# Patient Record
Sex: FEMALE | Race: OTHER | Employment: PART TIME | URBAN - METROPOLITAN AREA
[De-identification: names, ages, dates, MRNs, and addresses within clinical notes are randomized per-mention and may not be internally consistent; named-entity substitution may affect disease eponyms.]

---

## 2018-05-11 ENCOUNTER — HOSPITAL ENCOUNTER (EMERGENCY)
Age: 23
Discharge: HOME | End: 2018-05-11
Attending: EMERGENCY MEDICINE | Admitting: EMERGENCY MEDICINE
Payer: SELF-PAY

## 2018-05-11 VITALS
HEART RATE: 91 BPM | WEIGHT: 185 LBS | HEIGHT: 63 IN | OXYGEN SATURATION: 98 % | BODY MASS INDEX: 32.78 KG/M2 | SYSTOLIC BLOOD PRESSURE: 143 MMHG | TEMPERATURE: 98.8 F | RESPIRATION RATE: 28 BRPM | DIASTOLIC BLOOD PRESSURE: 81 MMHG

## 2018-05-11 DIAGNOSIS — R06.4 HYPERVENTILATION: Primary | ICD-10-CM

## 2018-05-11 PROCEDURE — 96372 THER/PROPH/DIAG INJ SC/IM: CPT

## 2018-05-11 PROCEDURE — 99283 EMERGENCY DEPT VISIT LOW MDM: CPT

## 2018-05-11 PROCEDURE — 74011250636 HC RX REV CODE- 250/636: Performed by: EMERGENCY MEDICINE

## 2018-05-11 RX ORDER — TRAZODONE HYDROCHLORIDE 100 MG/1
100 TABLET ORAL
COMMUNITY
End: 2018-08-09

## 2018-05-11 RX ORDER — LORAZEPAM 2 MG/ML
1 INJECTION INTRAMUSCULAR ONCE
Status: COMPLETED | OUTPATIENT
Start: 2018-05-11 | End: 2018-05-11

## 2018-05-11 RX ADMIN — LORAZEPAM 1 MG: 2 INJECTION INTRAMUSCULAR; INTRAVENOUS at 05:00

## 2018-05-11 NOTE — DISCHARGE INSTRUCTIONS
Hyperventilation: Care Instructions  Your Care Instructions  Hyperventilation is breathing that is deeper and faster than normal. It causes the amount of carbon dioxide (CO2) in the blood to drop. This may make you feel lightheaded. You may also have a fast heartbeat and feel short of breath. It also can lead to numbness or tingling in the hands or feet, anxiety, fainting, and sore chest muscles. Some causes of sudden hyperventilation include anxiety, asthma, emphysema, a head injury, fever, and some medicines. Hyperventilation also may be caused by a pattern of incorrect breathing. It most often happens when a physical or emotional event makes this breathing pattern worse. Hyperventilation may happen during pregnancy. But it usually goes away on its own after delivery. In many cases, hyperventilation can be controlled by learning proper breathing techniques. Follow-up care is a key part of your treatment and safety. Be sure to make and go to all appointments, and call your doctor if you are having problems. It's also a good idea to know your test results and keep a list of the medicines you take. How can you care for yourself at home? Breathing methods  · Breathe through pursed lips, as if you are whistling. Or pinch one nostril and breathe through your nose. It is harder to hyperventilate through your nose or through pursed lips because you can't move as much air. · Slow your breathing to 1 breath every 5 seconds, or slow enough that symptoms gradually go away. · Try belly-breathing. This fills your lungs fully, slows your breathing rate, and helps you relax. ¨ Place one hand on your belly just below the ribs. Place the other hand on your chest. You can do this while standing, but it may be more comfortable while you lie on the floor with your knees bent. ¨ Take a deep breath through your nose. As you breathe in, let your belly push your hand out. Keep your chest still.   ¨ As you breathe out through pursed lips, feel your hand go down. Use the hand on your belly to help you push all the air out. Take your time breathing out. ¨ Repeat these steps 3 to 10 times. Take your time with each breath. Always try to control your breathing or to belly-breathe first. If these techniques don't work and you don't have other health problems, you might try breathing in and out of a paper bag. Using a paper bag  · Take 6 to 12 easy, natural breaths, with a small paper bag held over your mouth and nose. Then remove the bag from your nose and mouth, and take easy, natural breaths. · Next, try belly-breathing. · Switch between these techniques until your hyperventilation stops. Do not try this method if:  · You have any heart or lung problems. · Rapid breathing happens at a high altitude. Breathing faster than normal is a natural response to high altitude. Follow these safety measures when using this method:  · Do not use a plastic bag. · Do not breathe continuously into a paper bag. Take 6 to 12 natural breaths with a paper bag held over your mouth and nose. Then remove the bag from your nose and mouth. · Do not hold the bag for a person who is hyperventilating. Let the person hold the bag over his or her own mouth and nose. When should you call for help? Call 911 anytime you think you may need emergency care. For example, call if:  ? · You passed out (lost consciousness). ?Call your doctor now or seek immediate medical care if:  ? · You hyperventilate for longer than 30 minutes. ? · You hyperventilate often. ? · Your symptoms do not improve with home treatment. ? · Your symptoms become more severe or more frequent. ? Watch closely for changes in your health, and be sure to contact your doctor if you have any problems. Where can you learn more? Go to http://caitlin-susan.info/. Enter W032 in the search box to learn more about \"Hyperventilation: Care Instructions. \"  Current as of: March 20, 2017  Content Version: 11.4  © 3970-0993 [employer]. Care instructions adapted under license by [employer] (which disclaims liability or warranty for this information). If you have questions about a medical condition or this instruction, always ask your healthcare professional. [address] any warranty or liability for your use of this information.

## 2018-05-11 NOTE — ED NOTES
Pt brought in by EMS. As per EMS, pt found on side of road, crying and hyperventilating after breaking up with boyfriend. Pt received alert, awake, tearful. Pt hyperventilating and unable to answer questions. Difficult to calm. Pt dry heaving. Pt given ativan 1mg IM. Pt instructed to take slow deep breaths to get breathing under control. Continue frequent monitoring.

## 2018-05-11 NOTE — ED PROVIDER NOTES
EMERGENCY DEPARTMENT HISTORY AND PHYSICAL EXAM    Date: 5/11/2018  Patient Name: Hadley Rain    History of Presenting Illness     Chief Complaint   Patient presents with    Anxiety     History Provided By: Patient's Father, Patient's Mother and EMS    Chief Complaint: Panic attack  Duration: PTA   Timing:  Acute  Location: Generalized  Quality: Attack  Severity: N/A  Modifying Factors: No relieving or worsening factors   Associated Symptoms: Hyperventilation, dry heaves, and back pain    Additional History (Context):   4:53 AM  Hadley Rain is a 23 y.o. female who presents via EMS to the emergency department C/O an acute panic attack, onset PTA. Associated sxs include hyperventilation and dry heaving. Per EMS, pt was walking with her boyfriend when he decided to \"break up with her. \" She then began to hyperventilate and dry heave. Per mother and father, the story EMS provided is incorrect. They state that the pt broke up with her boyfriend a few days ago. They state that pt took Trazodone between 11 PM and 12 AM tonight and then went to meet her boyfriend between 15 AM and 1 AM. Per mother and father, the pt began to feel light-headed and dizzy, began hyperventilating, and began having CP and back pain, which led to her \"fainting. \" Per mother, pt has been acting differently after taking her medication. No modifying or aggravating factors were reported. Pt denies any other sxs or complaints. PCP: Marisa Quevedo MD    Current Outpatient Prescriptions   Medication Sig Dispense Refill    traZODone (DESYREL) 100 mg tablet Take 100 mg by mouth nightly. Past History     Past Medical History:  History reviewed. No pertinent past medical history. Past Surgical History:  History reviewed. No pertinent surgical history. Family History:  History reviewed. No pertinent family history.     Social History:  Social History   Substance Use Topics    Smoking status: None    Smokeless tobacco: None    Alcohol use None       Allergies: Allergies   Allergen Reactions    Pcn [Penicillins] Unknown (comments)     Pt cannot communicate at this time. Review of Systems   Review of Systems   Respiratory:        (+) hyperventilating   Gastrointestinal:        (+) dry heaving   Musculoskeletal: Positive for back pain. All other systems reviewed and are negative. Physical Exam     Vitals:    05/11/18 0452 05/11/18 0537   BP: (!) 149/96 143/81   Pulse: (!) 120 91   Resp: (!) 36 28   Temp: 98.8 °F (37.1 °C)    SpO2: 100% 98%   Weight: 83.9 kg (185 lb)    Height: 5' 3\" (1.6 m)      Physical Exam   Constitutional: She is oriented to person, place, and time. She appears well-developed and well-nourished. HENT:   Head: Normocephalic and atraumatic. Eyes: Pupils are equal, round, and reactive to light. Neck: Neck supple. Cardiovascular: Normal rate, regular rhythm, S1 normal, S2 normal and normal heart sounds. Pulmonary/Chest: Breath sounds normal. No respiratory distress. She has no wheezes. She has no rales. She exhibits no tenderness. Hyperventilating upon arrival.    Abdominal: Soft. She exhibits no distension and no mass. There is no tenderness. There is no guarding. Musculoskeletal: Normal range of motion. She exhibits no edema or tenderness. Neurological: She is alert and oriented to person, place, and time. No cranial nerve deficit. Skin: No rash noted. Psychiatric: She has a normal mood and affect. Her behavior is normal. Thought content normal.   Nursing note and vitals reviewed. Diagnostic Study Results     Labs -   No results found for this or any previous visit (from the past 12 hour(s)).     Radiologic Studies -   No orders to display     CT Results  (Last 48 hours)    None        CXR Results  (Last 48 hours)    None          Medications given in the ED-  Medications   LORazepam (ATIVAN) injection 1 mg (1 mg IntraMUSCular Given 5/11/18 0500)       Medical Decision Making I am the first provider for this patient. I reviewed the vital signs, available nursing notes, past medical history, past surgical history, family history and social history. Vital Signs-Reviewed the patient's vital signs. Pulse Oximetry Analysis - 100% on RA     Records Reviewed: Nursing Notes    Provider Notes (Medical Decision Making):   DDX: Hyperventilation. Need to R/O electrolyte abnormalities, medication reaction, UTI, drug abuse, syncope, psych, and seizure. Procedures:  Procedures    ED Course:   4:53 AM Initial assessment performed. The patients presenting problems have been discussed, and they are in agreement with the care plan formulated and outlined with them. I have encouraged them to ask questions as they arise throughout their visit. 5:32 AM Mother at bedside Mother is upset and states that we were not taking the pt seriously. I explained to her what had been done and the history we got from EMS. Pt took Trazodone at 11 PM and since she has been started on it she has not been acting right afterwards. She wants pt DC and wants to go to [facility]. Pt is more calm and able to communicate. She is complaining of pain in her back and she agrees to go with her parents to [facility]. I informed the pt that She needed testing for adequate evaluation for her symptoms, that by refusing workup She is at risk for deterioration. She is awake, alert, She understands her condition and the risks involved in leaving. While the patient has received Ativan, it has been 45 minutes since last receiving this medication when having this conversation and is clinically aware of their surroundings and able to ask appropriate questions, the patients family member mother and father and the nurse present confirms She is not clinically intoxicated and able to make medical decisions. She verbalized knowing the same risks and understood it was recommended that She stay and could also return at any time.   her family member mother and father was present for the discussion and also verbalized that She understood both diagnosis, risks and could return at any time. They were both provided with warnings regarding worsening of her condition and were provided instructions to follow up with Breanna Monique MD tomorrow or return to the Emergency Room as soon as possible. This discussion was witnessed by Wayne Talley RN. Diagnosis and Disposition       DISCHARGE NOTE:  5:37 AM   Annalias Sims's  results have been reviewed with her. She has been counseled regarding her diagnosis, treatment, and plan. She verbally conveys understanding and agreement of the signs, symptoms, diagnosis, treatment and prognosis and additionally agrees to follow up as discussed. She also agrees with the care-plan and conveys that all of her questions have been answered. I have also provided discharge instructions for her that include: educational information regarding their diagnosis and treatment, and list of reasons why they would want to return to the ED prior to their follow-up appointment, should her condition change. She has been provided with education for proper emergency department utilization. CLINICAL IMPRESSION:    1. Hyperventilation        PLAN:  1. D/C Home  2. Current Discharge Medication List        3. Follow-up Information     Follow up With Details Comments [address] [facility]    [facility] EMERGENCY DEPT Go to As needed, if symptoms worsen [address] [id number]  [phone]        _______________________________    Attestations: This note is prepared by Shamika Reynoso. Ruth Sacks, acting as Scribe for [employer], MD.    [employer], MD:  The scribe's documentation has been prepared under my direction and personally reviewed by me in its entirety.   I confirm that the note above accurately reflects all work, treatment, procedures, and medical decision making performed by me.  _______________________________

## 2018-05-11 NOTE — ED NOTES
Parents at bedside and request to speak with MD who is occupied. This RN speaks to mother who states that she thinks pt is having reaction to trazadone. Advised that EMS and pt had conveyed to staff that pt was fine and had no sx prior to boyfriend telling her that he wanted to break up. Mother states that this is not the case. Pt states that she broke up with boyfriend a few days ago and tonight took her trazadone that was rx to her one week ago. States that she took med between 11-12 and went to meet with boyfriend at 12-1. States that while talking she was not upset and was feeling fine. States that she then began to feel dizzy and lightheaded while walking home and had some pain in chest before passing out. States that she then began to hyperventilate. Father request that staff give pt something to get trazadone out of her system. Advised that this wasn't possible but that medication she had been given would help with sx that she was having. Further informed that in light of new complaints and corrected timeline that further medical interventions were needed. Stated need for lab work and EKG and then proceeded to help pt remove shirt. Father stepped out to theodore while this RN and mother helped pt remove top and during this called to mother repeatedly in urgent manner. Mother left to theodore and this RN continued to aid pt. Placed gown on pt and then parents stepped back into room at which point mother stated to this RN \"he wants to take her to [place]\" and pointed to father. This RN advised that MD would be notified and in to speak with patient and family.

## 2018-05-11 NOTE — ED NOTES
Pt's breathing more regular and continued to instruct patient to take slow, deep breaths. Parents requesting to go to [facility] after requesting that her prescribed medication of trazadone be taken out of her system. Parents believe she is having a reaction to her trazadone. They then stated that she took her trazadone around 11pm last night [facility] and when they were walking back home she became dizzy and almost fainted. VS stable at this time. Spoke with parents alongside of Dr. Brigido Moran. Pt to be discharged.

## 2018-05-11 NOTE — ED NOTES
MD into room to speak with mother. Pt agrees to leaving and going to [facility] as parents prefer this. MD out from room and states that parents do not want this RN in room again as mother states that this RN was nonchalant and uncaring. Pt awaiting discharge, this RN not into room to discharge pt per parent request. Panda Aguiar discharge after registration.

## 2018-05-11 NOTE — ED TRIAGE NOTES
Pt was coming home with boyfriend when he decided to break up with her. Pt then proceeded to have a panic attack with dry heaves and hyperventilation. Presents to ER tearful and inconsolable.

## 2018-05-11 NOTE — ED NOTES
Pt stable at this time. Pt being discharged in parents care with patient agreeing to be discharged. I have reviewed discharge instructions with the patient. The patient verbalized understanding.

## 2018-05-11 NOTE — ED NOTES
5:35 AM  Patient was informed by emergency physician that she needed further workupfor adequate evaluation for her chest pain and abdominal pain, and that by refusing the above, she is at risk for further deterioration.  She is awake, alert, and she understands her condition and the risks involved in leaving.   She is clinically aware of her surroundings and able to ask appropriate questions, the patients parents and the nurse present confirm she is able to make medical decisions. She verbalized knowing the risks and understood it was recommended that she stay and could also return at any time.  The patient's parents were present for the discussion and also verbalized that they understood both diagnosis, risks and could return at any time.  The patient was provided with warnings regarding worsening of her condition and were provided instructions to follow up with No primary care provider on file. tomorrow or return to the Emergency Room as soon as possible. This information was discussed by the Emergency Room Physician Venus  and witnessed by Sarha Evans RN.

## 2018-08-09 ENCOUNTER — APPOINTMENT (OUTPATIENT)
Dept: GENERAL RADIOLOGY | Age: 23
End: 2018-08-09
Attending: PHYSICIAN ASSISTANT
Payer: SELF-PAY

## 2018-08-09 ENCOUNTER — HOSPITAL ENCOUNTER (EMERGENCY)
Age: 23
Discharge: HOME | End: 2018-08-09
Attending: EMERGENCY MEDICINE
Payer: SELF-PAY

## 2018-08-09 VITALS
SYSTOLIC BLOOD PRESSURE: 108 MMHG | DIASTOLIC BLOOD PRESSURE: 60 MMHG | OXYGEN SATURATION: 100 % | TEMPERATURE: 98 F | BODY MASS INDEX: 31.76 KG/M2 | HEIGHT: 64 IN | RESPIRATION RATE: 14 BRPM | HEART RATE: 64 BPM | WEIGHT: 186 LBS

## 2018-08-09 DIAGNOSIS — M62.838 MUSCLE SPASM: ICD-10-CM

## 2018-08-09 DIAGNOSIS — M25.552 LEFT HIP PAIN: Primary | ICD-10-CM

## 2018-08-09 LAB
APPEARANCE UR: CLEAR
BACTERIA URNS QL MICRO: ABNORMAL /HPF
BILIRUB UR QL: NEGATIVE
COLOR UR: ABNORMAL
EPITH CASTS URNS QL MICRO: ABNORMAL /LPF (ref 0–5)
GLUCOSE UR STRIP.AUTO-MCNC: NEGATIVE MG/DL
HCG UR QL: NEGATIVE
HGB UR QL STRIP: NEGATIVE
KETONES UR QL STRIP.AUTO: 40 MG/DL
LEUKOCYTE ESTERASE UR QL STRIP.AUTO: ABNORMAL
MUCOUS THREADS URNS QL MICRO: ABNORMAL /LPF
NITRITE UR QL STRIP.AUTO: NEGATIVE
PH UR STRIP: 6 [PH] (ref 5–8)
PROT UR STRIP-MCNC: NEGATIVE MG/DL
RBC #/AREA URNS HPF: ABNORMAL /HPF (ref 0–5)
SP GR UR REFRACTOMETRY: 1.03 (ref 1–1.03)
UROBILINOGEN UR QL STRIP.AUTO: 1 EU/DL (ref 0.2–1)
WBC URNS QL MICRO: ABNORMAL /HPF (ref 0–5)

## 2018-08-09 PROCEDURE — 99284 EMERGENCY DEPT VISIT MOD MDM: CPT

## 2018-08-09 PROCEDURE — 96372 THER/PROPH/DIAG INJ SC/IM: CPT

## 2018-08-09 PROCEDURE — 73502 X-RAY EXAM HIP UNI 2-3 VIEWS: CPT

## 2018-08-09 PROCEDURE — 74011250636 HC RX REV CODE- 250/636: Performed by: PHYSICIAN ASSISTANT

## 2018-08-09 PROCEDURE — 81001 URINALYSIS AUTO W/SCOPE: CPT | Performed by: PHYSICIAN ASSISTANT

## 2018-08-09 PROCEDURE — 81025 URINE PREGNANCY TEST: CPT

## 2018-08-09 RX ORDER — KETOROLAC TROMETHAMINE 30 MG/ML
60 INJECTION, SOLUTION INTRAMUSCULAR; INTRAVENOUS
Status: COMPLETED | OUTPATIENT
Start: 2018-08-09 | End: 2018-08-09

## 2018-08-09 RX ORDER — CYCLOBENZAPRINE HCL 10 MG
10 TABLET ORAL
Qty: 10 TAB | Refills: 0 | Status: SHIPPED | OUTPATIENT
Start: 2018-08-09 | End: 2018-12-07

## 2018-08-09 RX ADMIN — KETOROLAC TROMETHAMINE 60 MG: 30 INJECTION, SOLUTION INTRAMUSCULAR at 17:31

## 2018-08-09 NOTE — DISCHARGE INSTRUCTIONS
Hip Pain: Care Instructions  Your Care Instructions    Hip pain may be caused by many things, including overuse, a fall, or a twisting movement. Another cause of hip pain is arthritis. Your pain may increase when you stand up, walk, or squat. The pain may come and go or may be constant. Home treatment can help relieve hip pain, swelling, and stiffness. If your pain is ongoing, you may need more tests and treatment. Follow-up care is a key part of your treatment and safety. Be sure to make and go to all appointments, and call your doctor if you are having problems. It's also a good idea to know your test results and keep a list of the medicines you take. How can you care for yourself at home? · Take pain medicines exactly as directed. ¨ If the doctor gave you a prescription medicine for pain, take it as prescribed. ¨ If you are not taking a prescription pain medicine, ask your doctor if you can take an over-the-counter medicine. · Rest and protect your hip. Take a break from any activity, including standing or walking, that may cause pain. · Put ice or a cold pack against your hip for 10 to 20 minutes at a time. Try to do this every 1 to 2 hours for the next 3 days (when you are awake) or until the swelling goes down. Put a thin cloth between the ice and your skin. · Sleep on your healthy side with a pillow between your knees, or sleep on your back with pillows under your knees. · If there is no swelling, you can put moist heat, a heating pad, or a warm cloth on your hip. Do gentle stretching exercises to help keep your hip flexible. · Learn how to prevent falls. Have your vision and hearing checked regularly. Wear slippers or shoes with a nonskid sole. · Stay at a healthy weight. · Wear comfortable shoes. When should you call for help? Call 911 anytime you think you may need emergency care.  For example, call if:    · You have sudden chest pain and shortness of breath, or you cough up blood.     · You are not able to stand or walk or bear weight.     · Your buttocks, legs, or feet feel numb or tingly.     · Your leg or foot is cool or pale or changes color.     · You have severe pain.    Call your doctor now or seek immediate medical care if:    · You have signs of infection, such as:  ¨ Increased pain, swelling, warmth, or redness in the hip area. ¨ Red streaks leading from the hip area. ¨ Pus draining from the hip area. ¨ A fever.     · You have signs of a blood clot, such as:  ¨ Pain in your calf, back of the knee, thigh, or groin. ¨ Redness and swelling in your leg or groin.     · You are not able to bend, straighten, or move your leg normally.     · You have trouble urinating or having bowel movements.    Watch closely for changes in your health, and be sure to contact your doctor if:    · You do not get better as expected. Where can you learn more? Go to http://caitlin-susan.info/. Enter V738 in the search box to learn more about \"Hip Pain: Care Instructions. \"  Current as of: November 20, 2017  Content Version: 11.7  © 8596-1133 [employer]. Care instructions adapted under license by [employer] (which disclaims liability or warranty for this information). If you have questions about a medical condition or this instruction, always ask your healthcare professional. [place] [address] any warranty or liability for your use of this information. Muscle Cramps: Care Instructions  Your Care Instructions    A muscle cramp occurs when a muscle tightens up suddenly. A cramp often happens in the legs. A muscle cramp is also called a muscle spasm or a charley horse. Muscle cramps usually last less than a minute. However, the pain may last for several minutes. Leg cramps that occur at night may wake you up. Heavy exercise, dehydration, and being overweight can increase your risk of getting cramps.  An imbalance of certain chemicals in your blood, called electrolytes, can also lead to muscle cramps. Pregnant women sometimes get muscle cramps during sleep. Muscle cramps can be treated by stretching and massaging the muscle. If cramps keep coming back, your doctor may prescribe medicine that relaxes your muscles. Follow-up care is a key part of your treatment and safety. Be sure to make and go to all appointments, and call your doctor if you are having problems. It's also a good idea to know your test results and keep a list of the medicines you take. How can you care for yourself at home? · Drink plenty of fluids to prevent dehydration. Choose water and other caffeine-free clear liquids until you feel better. If you have kidney, heart, or liver disease and have to limit fluids, talk with your doctor before you increase the amount of fluids you drink. · Stretch your muscles every day, especially before and after exercise and at bedtime. Regular stretching can relax your muscles and may prevent cramps. · Do not suddenly increase the amount of exercise you get. Increase your exercise a little each week. · When you get a cramp, stretch and massage the muscle. You can also take a warm shower or bath to relax the muscle. A heating pad placed on the muscle can also help. · Take a daily multivitamin supplement. · Ask your doctor if you can take an over-the-counter pain medicine, such as acetaminophen (Tylenol), ibuprofen (Advil, Motrin), or naproxen (Aleve). Be safe with medicines. Read and follow all instructions on the label. When should you call for help? Watch closely for changes in your health, and be sure to contact your doctor if:    · You get muscle cramps often that do not go away after home treatment.     · Your muscle cramps often wake you up at night.     · You do not get better as expected. Where can you learn more? Go to http://caitlin-susan.info/.   Enter K934 in the search box to learn more about \"Muscle Cramps: Care Instructions. \"  Current as of: November 29, 2017  Content Version: 11.7  © 8147-0687 [employer]. Care instructions adapted under license by [employer] (which disclaims liability or warranty for this information). If you have questions about a medical condition or this instruction, always ask your healthcare professional. [place] [address] any warranty or liability for your use of this information.
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
## 2018-08-09 NOTE — ED TRIAGE NOTES
Patient actively hyperventilating in triage. Patient was jumping on a trampoline 2 days ago and since then has been having stabbing left hip pain.

## 2018-12-07 ENCOUNTER — APPOINTMENT (OUTPATIENT)
Dept: ULTRASOUND IMAGING | Age: 23
DRG: 195 | End: 2018-12-07
Attending: PHYSICIAN ASSISTANT
Payer: COMMERCIAL

## 2018-12-07 ENCOUNTER — APPOINTMENT (OUTPATIENT)
Dept: GENERAL RADIOLOGY | Age: 23
DRG: 195 | End: 2018-12-07
Attending: PHYSICIAN ASSISTANT
Payer: COMMERCIAL

## 2018-12-07 ENCOUNTER — HOSPITAL ENCOUNTER (INPATIENT)
Age: 23
LOS: 1 days | Discharge: HOME | DRG: 195 | End: 2018-12-09
Attending: EMERGENCY MEDICINE | Admitting: INTERNAL MEDICINE
Payer: COMMERCIAL

## 2018-12-07 DIAGNOSIS — J18.9 PNEUMONIA OF RIGHT MIDDLE LOBE DUE TO INFECTIOUS ORGANISM: Primary | ICD-10-CM

## 2018-12-07 DIAGNOSIS — R10.9 RIGHT FLANK PAIN: ICD-10-CM

## 2018-12-07 LAB
ALBUMIN SERPL-MCNC: 4 G/DL (ref 3.4–5)
ALBUMIN/GLOB SERPL: 1.2 {RATIO} (ref 0.8–1.7)
ALP SERPL-CCNC: 74 U/L (ref 45–117)
ALT SERPL-CCNC: 17 U/L (ref 13–56)
ANION GAP SERPL CALC-SCNC: 10 MMOL/L (ref 3–18)
APPEARANCE UR: CLEAR
APTT PPP: 32.2 SEC (ref 23–36.4)
AST SERPL-CCNC: 18 U/L (ref 15–37)
BACTERIA URNS QL MICRO: ABNORMAL /HPF
BASOPHILS # BLD: 0.1 K/UL (ref 0–0.1)
BASOPHILS NFR BLD: 1 % (ref 0–2)
BILIRUB SERPL-MCNC: 0.4 MG/DL (ref 0.2–1)
BILIRUB UR QL: NEGATIVE
BUN SERPL-MCNC: 10 MG/DL (ref 7–18)
BUN/CREAT SERPL: 13
CALCIUM SERPL-MCNC: 9.4 MG/DL (ref 8.5–10.1)
CHLORIDE SERPL-SCNC: 109 MMOL/L (ref 100–108)
CK MB CFR SERPL CALC: ABNORMAL % (ref 0–4)
CK MB SERPL-MCNC: <1 NG/ML (ref 5–25)
CK SERPL-CCNC: 212 U/L (ref 26–192)
CO2 SERPL-SCNC: 20 MMOL/L (ref 21–32)
COLOR UR: YELLOW
CREAT SERPL-MCNC: 0.77 MG/DL (ref 0.6–1.3)
DIFFERENTIAL METHOD BLD: ABNORMAL
EOSINOPHIL # BLD: 0.2 K/UL (ref 0–0.4)
EOSINOPHIL NFR BLD: 2 % (ref 0–5)
EPITH CASTS URNS QL MICRO: ABNORMAL /LPF (ref 0–5)
ERYTHROCYTE [DISTWIDTH] IN BLOOD BY AUTOMATED COUNT: 12.5 % (ref 11.6–14.5)
GLOBULIN SER CALC-MCNC: 3.3 G/DL (ref 2–4)
GLUCOSE SERPL-MCNC: 85 MG/DL (ref 74–99)
GLUCOSE UR STRIP.AUTO-MCNC: NEGATIVE MG/DL
HCG UR QL: NEGATIVE
HCT VFR BLD AUTO: 37.2 % (ref 35–45)
HGB BLD-MCNC: 12.8 G/DL (ref 12–16)
HGB UR QL STRIP: NEGATIVE
INR PPP: 1 (ref 0.8–1.2)
KETONES UR QL STRIP.AUTO: ABNORMAL MG/DL
LEUKOCYTE ESTERASE UR QL STRIP.AUTO: ABNORMAL
LIPASE SERPL-CCNC: 121 U/L (ref 73–393)
LYMPHOCYTES # BLD: 2 K/UL (ref 0.9–3.6)
LYMPHOCYTES NFR BLD: 19 % (ref 21–52)
MCH RBC QN AUTO: 28.6 PG (ref 24–34)
MCHC RBC AUTO-ENTMCNC: 34.4 G/DL (ref 31–37)
MCV RBC AUTO: 83 FL (ref 74–97)
MONOCYTES # BLD: 1 K/UL (ref 0.05–1.2)
MONOCYTES NFR BLD: 9 % (ref 3–10)
MUCOUS THREADS URNS QL MICRO: ABNORMAL /LPF
NEUTS SEG # BLD: 7.2 K/UL (ref 1.8–8)
NEUTS SEG NFR BLD: 69 % (ref 40–73)
NITRITE UR QL STRIP.AUTO: NEGATIVE
PH UR STRIP: 5.5 [PH] (ref 5–8)
PLATELET # BLD AUTO: 362 K/UL (ref 135–420)
PMV BLD AUTO: 9.1 FL (ref 9.2–11.8)
POTASSIUM SERPL-SCNC: 3.8 MMOL/L (ref 3.5–5.5)
PROT SERPL-MCNC: 7.3 G/DL (ref 6.4–8.2)
PROT UR STRIP-MCNC: NEGATIVE MG/DL
PROTHROMBIN TIME: 12.9 SEC (ref 11.5–15.2)
RBC # BLD AUTO: 4.48 M/UL (ref 4.2–5.3)
RBC #/AREA URNS HPF: NEGATIVE /HPF (ref 0–5)
SERVICE CMNT-IMP: NORMAL
SODIUM SERPL-SCNC: 139 MMOL/L (ref 136–145)
SP GR UR REFRACTOMETRY: 1.02 (ref 1–1.03)
TROPONIN I SERPL-MCNC: <0.02 NG/ML (ref 0–0.04)
UROBILINOGEN UR QL STRIP.AUTO: 0.2 EU/DL (ref 0.2–1)
WBC # BLD AUTO: 10.4 K/UL (ref 4.6–13.2)
WBC URNS QL MICRO: ABNORMAL /HPF (ref 0–5)
WET PREP GENITAL: NORMAL
WET PREP GENITAL: NORMAL

## 2018-12-07 PROCEDURE — 96374 THER/PROPH/DIAG INJ IV PUSH: CPT

## 2018-12-07 PROCEDURE — 93005 ELECTROCARDIOGRAM TRACING: CPT

## 2018-12-07 PROCEDURE — 96376 TX/PRO/DX INJ SAME DRUG ADON: CPT

## 2018-12-07 PROCEDURE — 74022 RADEX COMPL AQT ABD SERIES: CPT

## 2018-12-07 PROCEDURE — 96372 THER/PROPH/DIAG INJ SC/IM: CPT

## 2018-12-07 PROCEDURE — 80053 COMPREHEN METABOLIC PANEL: CPT

## 2018-12-07 PROCEDURE — 85025 COMPLETE CBC W/AUTO DIFF WBC: CPT

## 2018-12-07 PROCEDURE — 87210 SMEAR WET MOUNT SALINE/INK: CPT

## 2018-12-07 PROCEDURE — 82553 CREATINE MB FRACTION: CPT

## 2018-12-07 PROCEDURE — 87591 N.GONORRHOEAE DNA AMP PROB: CPT

## 2018-12-07 PROCEDURE — 85730 THROMBOPLASTIN TIME PARTIAL: CPT

## 2018-12-07 PROCEDURE — 81025 URINE PREGNANCY TEST: CPT

## 2018-12-07 PROCEDURE — 99285 EMERGENCY DEPT VISIT HI MDM: CPT

## 2018-12-07 PROCEDURE — 81001 URINALYSIS AUTO W/SCOPE: CPT

## 2018-12-07 PROCEDURE — 76830 TRANSVAGINAL US NON-OB: CPT

## 2018-12-07 PROCEDURE — 74011250636 HC RX REV CODE- 250/636: Performed by: PHYSICIAN ASSISTANT

## 2018-12-07 PROCEDURE — 96375 TX/PRO/DX INJ NEW DRUG ADDON: CPT

## 2018-12-07 PROCEDURE — 85610 PROTHROMBIN TIME: CPT

## 2018-12-07 PROCEDURE — 74011250636 HC RX REV CODE- 250/636: Performed by: EMERGENCY MEDICINE

## 2018-12-07 PROCEDURE — 96361 HYDRATE IV INFUSION ADD-ON: CPT

## 2018-12-07 PROCEDURE — 83690 ASSAY OF LIPASE: CPT

## 2018-12-07 RX ORDER — FENTANYL CITRATE 50 UG/ML
100 INJECTION, SOLUTION INTRAMUSCULAR; INTRAVENOUS ONCE
Status: COMPLETED | OUTPATIENT
Start: 2018-12-07 | End: 2018-12-08

## 2018-12-07 RX ORDER — ONDANSETRON 2 MG/ML
4 INJECTION INTRAMUSCULAR; INTRAVENOUS
Status: COMPLETED | OUTPATIENT
Start: 2018-12-07 | End: 2018-12-07

## 2018-12-07 RX ORDER — MORPHINE SULFATE 4 MG/ML
4 INJECTION INTRAVENOUS
Status: COMPLETED | OUTPATIENT
Start: 2018-12-07 | End: 2018-12-07

## 2018-12-07 RX ORDER — FAMOTIDINE 10 MG/ML
20 INJECTION INTRAVENOUS
Status: COMPLETED | OUTPATIENT
Start: 2018-12-07 | End: 2018-12-07

## 2018-12-07 RX ORDER — ONDANSETRON 2 MG/ML
4 INJECTION INTRAMUSCULAR; INTRAVENOUS
Status: COMPLETED | OUTPATIENT
Start: 2018-12-07 | End: 2018-12-08

## 2018-12-07 RX ORDER — DICYCLOMINE HYDROCHLORIDE 10 MG/ML
20 INJECTION INTRAMUSCULAR ONCE
Status: COMPLETED | OUTPATIENT
Start: 2018-12-07 | End: 2018-12-07

## 2018-12-07 RX ORDER — FENTANYL CITRATE 50 UG/ML
50 INJECTION, SOLUTION INTRAMUSCULAR; INTRAVENOUS
Status: COMPLETED | OUTPATIENT
Start: 2018-12-07 | End: 2018-12-07

## 2018-12-07 RX ORDER — HYDROMORPHONE HYDROCHLORIDE 1 MG/ML
1 INJECTION, SOLUTION INTRAMUSCULAR; INTRAVENOUS; SUBCUTANEOUS ONCE
Status: COMPLETED | OUTPATIENT
Start: 2018-12-07 | End: 2018-12-08

## 2018-12-07 RX ADMIN — ONDANSETRON 4 MG: 2 INJECTION INTRAMUSCULAR; INTRAVENOUS at 19:38

## 2018-12-07 RX ADMIN — SODIUM CHLORIDE 1000 ML: 900 INJECTION, SOLUTION INTRAVENOUS at 21:49

## 2018-12-07 RX ADMIN — DICYCLOMINE HYDROCHLORIDE 20 MG: 20 INJECTION, SOLUTION INTRAMUSCULAR at 18:04

## 2018-12-07 RX ADMIN — MORPHINE SULFATE 4 MG: 4 INJECTION INTRAVENOUS at 22:01

## 2018-12-07 RX ADMIN — MORPHINE SULFATE 4 MG: 4 INJECTION INTRAVENOUS at 19:38

## 2018-12-07 RX ADMIN — FAMOTIDINE 20 MG: 10 INJECTION, SOLUTION INTRAVENOUS at 18:01

## 2018-12-07 RX ADMIN — FENTANYL CITRATE 50 MCG: 50 INJECTION, SOLUTION INTRAMUSCULAR; INTRAVENOUS at 18:02

## 2018-12-07 RX ADMIN — SODIUM CHLORIDE 1000 ML: 900 INJECTION, SOLUTION INTRAVENOUS at 19:30

## 2018-12-07 RX ADMIN — SODIUM CHLORIDE 1000 ML: 900 INJECTION, SOLUTION INTRAVENOUS at 18:02

## 2018-12-07 RX ADMIN — ONDANSETRON 4 MG: 2 INJECTION INTRAMUSCULAR; INTRAVENOUS at 22:01

## 2018-12-07 RX ADMIN — ONDANSETRON 4 MG: 2 INJECTION INTRAMUSCULAR; INTRAVENOUS at 18:01
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
## 2018-12-07 NOTE — LETTER
[facility] [place] [id number] 
[phone] Date: 12/12/2018 Iona Mcdermott Please call [facility] at [phone] to discuss your results as soon as possible Sincerely, Js Jones PA-C
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
## 2018-12-08 ENCOUNTER — APPOINTMENT (OUTPATIENT)
Dept: CT IMAGING | Age: 23
DRG: 195 | End: 2018-12-08
Attending: PHYSICIAN ASSISTANT
Payer: COMMERCIAL

## 2018-12-08 PROBLEM — R10.9 FLANK PAIN: Status: ACTIVE | Noted: 2018-12-08

## 2018-12-08 PROBLEM — R11.2 INTRACTABLE VOMITING WITH NAUSEA: Status: ACTIVE | Noted: 2018-12-08

## 2018-12-08 PROBLEM — J18.9 RIGHT MIDDLE LOBE PNEUMONIA: Status: ACTIVE | Noted: 2018-12-08

## 2018-12-08 PROBLEM — J18.9 PNEUMONIA: Status: ACTIVE | Noted: 2018-12-08

## 2018-12-08 LAB
ANION GAP SERPL CALC-SCNC: 11 MMOL/L (ref 3–18)
BUN SERPL-MCNC: 6 MG/DL (ref 7–18)
BUN/CREAT SERPL: 10
CALCIUM SERPL-MCNC: 7.9 MG/DL (ref 8.5–10.1)
CHLORIDE SERPL-SCNC: 109 MMOL/L (ref 100–108)
CO2 SERPL-SCNC: 17 MMOL/L (ref 21–32)
CREAT SERPL-MCNC: 0.63 MG/DL (ref 0.6–1.3)
FLUAV AG NPH QL IA: NEGATIVE
FLUBV AG NOSE QL IA: NEGATIVE
GLUCOSE SERPL-MCNC: 79 MG/DL (ref 74–99)
POTASSIUM SERPL-SCNC: 3.4 MMOL/L (ref 3.5–5.5)
SODIUM SERPL-SCNC: 137 MMOL/L (ref 136–145)

## 2018-12-08 PROCEDURE — 74011000250 HC RX REV CODE- 250: Performed by: EMERGENCY MEDICINE

## 2018-12-08 PROCEDURE — 74011250636 HC RX REV CODE- 250/636: Performed by: INTERNAL MEDICINE

## 2018-12-08 PROCEDURE — 96376 TX/PRO/DX INJ SAME DRUG ADON: CPT

## 2018-12-08 PROCEDURE — 87804 INFLUENZA ASSAY W/OPTIC: CPT

## 2018-12-08 PROCEDURE — 74011250636 HC RX REV CODE- 250/636: Performed by: EMERGENCY MEDICINE

## 2018-12-08 PROCEDURE — 74177 CT ABD & PELVIS W/CONTRAST: CPT

## 2018-12-08 PROCEDURE — 65270000029 HC RM PRIVATE

## 2018-12-08 PROCEDURE — 80048 BASIC METABOLIC PNL TOTAL CA: CPT

## 2018-12-08 PROCEDURE — 77010033678 HC OXYGEN DAILY

## 2018-12-08 PROCEDURE — 74011250637 HC RX REV CODE- 250/637: Performed by: EMERGENCY MEDICINE

## 2018-12-08 PROCEDURE — 87040 BLOOD CULTURE FOR BACTERIA: CPT

## 2018-12-08 PROCEDURE — 74011000258 HC RX REV CODE- 258: Performed by: INTERNAL MEDICINE

## 2018-12-08 PROCEDURE — 74011636320 HC RX REV CODE- 636/320: Performed by: EMERGENCY MEDICINE

## 2018-12-08 RX ORDER — HYDROMORPHONE HYDROCHLORIDE 1 MG/ML
1 INJECTION, SOLUTION INTRAMUSCULAR; INTRAVENOUS; SUBCUTANEOUS
Status: DISCONTINUED | OUTPATIENT
Start: 2018-12-08 | End: 2018-12-09 | Stop reason: HOSPADM

## 2018-12-08 RX ORDER — CEFTRIAXONE 1 G/1
1 INJECTION, POWDER, FOR SOLUTION INTRAMUSCULAR; INTRAVENOUS
Status: DISCONTINUED | OUTPATIENT
Start: 2018-12-08 | End: 2018-12-08

## 2018-12-08 RX ORDER — ONDANSETRON 2 MG/ML
4 INJECTION INTRAMUSCULAR; INTRAVENOUS
Status: DISCONTINUED | OUTPATIENT
Start: 2018-12-08 | End: 2018-12-09 | Stop reason: HOSPADM

## 2018-12-08 RX ORDER — SODIUM CHLORIDE 0.9 % (FLUSH) 0.9 %
5-10 SYRINGE (ML) INJECTION EVERY 8 HOURS
Status: DISCONTINUED | OUTPATIENT
Start: 2018-12-08 | End: 2018-12-09 | Stop reason: HOSPADM

## 2018-12-08 RX ORDER — DEXTROSE MONOHYDRATE AND SODIUM CHLORIDE 5; .9 G/100ML; G/100ML
100 INJECTION, SOLUTION INTRAVENOUS CONTINUOUS
Status: DISCONTINUED | OUTPATIENT
Start: 2018-12-08 | End: 2018-12-09 | Stop reason: HOSPADM

## 2018-12-08 RX ORDER — ENOXAPARIN SODIUM 100 MG/ML
40 INJECTION SUBCUTANEOUS EVERY 24 HOURS
Status: DISCONTINUED | OUTPATIENT
Start: 2018-12-08 | End: 2018-12-09 | Stop reason: HOSPADM

## 2018-12-08 RX ORDER — POTASSIUM CHLORIDE 7.45 MG/ML
10 INJECTION INTRAVENOUS ONCE
Status: COMPLETED | OUTPATIENT
Start: 2018-12-08 | End: 2018-12-09

## 2018-12-08 RX ORDER — HYDROMORPHONE HYDROCHLORIDE 1 MG/ML
1 INJECTION, SOLUTION INTRAMUSCULAR; INTRAVENOUS; SUBCUTANEOUS
Status: DISCONTINUED | OUTPATIENT
Start: 2018-12-08 | End: 2018-12-08

## 2018-12-08 RX ORDER — DIPHENHYDRAMINE HCL 12.5MG/5ML
50 ELIXIR ORAL
Status: COMPLETED | OUTPATIENT
Start: 2018-12-08 | End: 2018-12-08

## 2018-12-08 RX ORDER — SODIUM CHLORIDE 0.9 % (FLUSH) 0.9 %
5-10 SYRINGE (ML) INJECTION AS NEEDED
Status: DISCONTINUED | OUTPATIENT
Start: 2018-12-08 | End: 2018-12-09 | Stop reason: HOSPADM

## 2018-12-08 RX ORDER — LORAZEPAM 2 MG/ML
1 INJECTION INTRAMUSCULAR
Status: COMPLETED | OUTPATIENT
Start: 2018-12-08 | End: 2018-12-08

## 2018-12-08 RX ADMIN — LORAZEPAM 1 MG: 2 INJECTION INTRAMUSCULAR; INTRAVENOUS at 04:00

## 2018-12-08 RX ADMIN — HYDROMORPHONE HYDROCHLORIDE 1 MG: 1 INJECTION, SOLUTION INTRAMUSCULAR; INTRAVENOUS; SUBCUTANEOUS at 02:09

## 2018-12-08 RX ADMIN — SODIUM CHLORIDE 500 MG: 900 INJECTION, SOLUTION INTRAVENOUS at 04:08

## 2018-12-08 RX ADMIN — HYDROMORPHONE HYDROCHLORIDE 1 MG: 1 INJECTION, SOLUTION INTRAMUSCULAR; INTRAVENOUS; SUBCUTANEOUS at 18:14

## 2018-12-08 RX ADMIN — FENTANYL CITRATE 100 MCG: 50 INJECTION, SOLUTION INTRAMUSCULAR; INTRAVENOUS at 00:46

## 2018-12-08 RX ADMIN — ONDANSETRON 4 MG: 2 INJECTION INTRAMUSCULAR; INTRAVENOUS at 22:30

## 2018-12-08 RX ADMIN — IOPAMIDOL 100 ML: 612 INJECTION, SOLUTION INTRAVENOUS at 02:16

## 2018-12-08 RX ADMIN — DEXTROSE MONOHYDRATE AND SODIUM CHLORIDE 100 ML/HR: 5; .9 INJECTION, SOLUTION INTRAVENOUS at 08:00

## 2018-12-08 RX ADMIN — ONDANSETRON 4 MG: 2 INJECTION INTRAMUSCULAR; INTRAVENOUS at 18:14

## 2018-12-08 RX ADMIN — POTASSIUM CHLORIDE 10 MEQ: 7.46 INJECTION, SOLUTION INTRAVENOUS at 08:22

## 2018-12-08 RX ADMIN — ONDANSETRON 4 MG: 2 INJECTION INTRAMUSCULAR; INTRAVENOUS at 00:47

## 2018-12-08 RX ADMIN — DIPHENHYDRAMINE HYDROCHLORIDE 50 MG: 25 SOLUTION ORAL at 02:07

## 2018-12-08 RX ADMIN — HYDROMORPHONE HYDROCHLORIDE 1 MG: 1 INJECTION, SOLUTION INTRAMUSCULAR; INTRAVENOUS; SUBCUTANEOUS at 21:35

## 2018-12-08 RX ADMIN — ENOXAPARIN SODIUM 40 MG: 40 INJECTION SUBCUTANEOUS at 08:00

## 2018-12-08 RX ADMIN — CEFTRIAXONE 1 G: 1 INJECTION, POWDER, FOR SOLUTION INTRAMUSCULAR; INTRAVENOUS at 03:58

## 2018-12-08 NOTE — PROGRESS NOTES
Bedside shift change report given to VIVIANA Morales (oncoming nurse) by ANITHA Bower (offgoing nurse). Report included the following information SBAR, Kardex, Intake/Output and MAR.

## 2018-12-08 NOTE — PROGRESS NOTES
Problem: Falls - Risk of  Goal: *Absence of Falls  Document Heriberto Fall Risk and appropriate interventions in the flowsheet.   Outcome: Progressing Towards Goal  Fall Risk Interventions:            Medication Interventions: Evaluate medications/consider consulting pharmacy, Patient to call before getting OOB, Teach patient to arise slowly
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
## 2018-12-08 NOTE — ROUTINE PROCESS
TRANSFER - OUT REPORT:    Verbal report given to Kervin Weinstein RN(name) on Shlomo Hem  being transferred to [facility] (unit) for routine progression of care       Report consisted of patients Situation, Background, Assessment and   Recommendations(SBAR). Information from the following report(s) SBAR, ED Summary, Procedure Summary, Intake/Output, MAR, Recent Results, Med Rec Status and Cardiac Rhythm normal sinus  was reviewed with the receiving nurse. Lines:   Peripheral IV 12/07/18 Right Antecubital (Active)   Site Assessment Clean, dry, & intact 12/7/2018  5:42 PM   Phlebitis Assessment 0 12/7/2018  5:42 PM   Infiltration Assessment 0 12/7/2018  5:42 PM   Dressing Status Clean, dry, & intact 12/7/2018  5:42 PM   Dressing Type Tape;Transparent 12/7/2018  5:42 PM   Hub Color/Line Status Pink 12/7/2018  5:42 PM   Action Taken Blood drawn 12/7/2018  5:42 PM   Alcohol Cap Used Yes 12/7/2018  5:42 PM        Opportunity for questions and clarification was provided.       Patient transported with:   Monitor  O2 @ 2 liters  Tech

## 2018-12-08 NOTE — PROGRESS NOTES
Shift Summary: Pt A&Ox 4, friend at bedside, Pt in NAD, IV infusing. PRN pain and nausea meds required x1 this shift. Will continue to monitor.

## 2018-12-08 NOTE — PROGRESS NOTES
Bedside shift change report given to ANITHA Lyons (oncoming nurse) by Romayne Pitter (offgoing nurse). Report included the following information SBAR, Kardex, Intake/Output, MAR and Recent Results.

## 2018-12-08 NOTE — ROUTINE PROCESS
Bedside and Verbal shift change report given to Jimmie Fletcher RN (oncoming nurse) by Светлана Garay RN (offgoing nurse). Report included the following information SBAR, Kardex, ED Summary, Procedure Summary, Intake/Output, MAR, Recent Results and Med Rec Status.

## 2018-12-08 NOTE — ROUTINE PROCESS
TRANSFER - IN REPORT:    Verbal report received from Shashi Cole, RN (name) on [facility]  being received from ED (unit) for routine progression of care      Report consisted of patients Situation, Background, Assessment and   Recommendations(SBAR). Information from the following report(s) SBAR, Kardex, ED Summary, Procedure Summary, Intake/Output, MAR, Recent Results and Med Rec Status was reviewed with the receiving nurse. Opportunity for questions and clarification was provided. Assessment completed upon patients arrival to unit and care assumed.

## 2018-12-08 NOTE — ED NOTES
Assumed care and received report of patient from Yamila Charles RN, pt on stretcher recently back from Ultrasound pt reports increase in pain, pt tearful boyfriend bedside.

## 2018-12-08 NOTE — PROGRESS NOTES
DC Plan:  Discharge home with MD follow up, family assistance once medically stable    Chart reviewed. Pt admitted for pneumonia. Met with pt and pts friend at bedside. Pt independent. Pt lives in [place] with her brother. Pts friend/family will drive her home at discharge. Pt noted be wearing oxygen on assessment, but denies having home oxygen. Nursing, please wean pt off oxygen as medically appropriate. Pts PCP is MD Candi Felty. Pt noted to be self pay, Med Assist notified. Pt states she thinks she has insurance and will try to locate card. No immediate dc concerns identified. CM will cont to follow, as needed. Reason for Admission:   pneumonia                   RRAT Score:     6 low                Plan for utilizing home health:    Pt independent, none                      Likelihood of Readmission:  low                         Transition of Care Plan:      Discharge home with MD follow up                Care Management Interventions  PCP Verified by CM: Zaida Escalante)  Mode of Transport at Discharge:  Other (see comment)(friend)  Transition of Care Consult (CM Consult): Discharge Planning  Current Support Network: Relative's Home(lives w brother)  Plan discussed with Pt/Family/Caregiver: Yes  Discharge Location  Discharge Placement: Home with family assistance

## 2018-12-09 VITALS
HEART RATE: 78 BPM | SYSTOLIC BLOOD PRESSURE: 132 MMHG | RESPIRATION RATE: 18 BRPM | TEMPERATURE: 98.3 F | WEIGHT: 172.18 LBS | DIASTOLIC BLOOD PRESSURE: 104 MMHG | BODY MASS INDEX: 30.51 KG/M2 | OXYGEN SATURATION: 100 % | HEIGHT: 63 IN

## 2018-12-09 LAB
ANION GAP SERPL CALC-SCNC: 7 MMOL/L (ref 3–18)
BASOPHILS # BLD: 0 K/UL (ref 0–0.1)
BASOPHILS NFR BLD: 1 % (ref 0–2)
BUN SERPL-MCNC: 5 MG/DL (ref 7–18)
BUN/CREAT SERPL: 8
CALCIUM SERPL-MCNC: 8.5 MG/DL (ref 8.5–10.1)
CHLORIDE SERPL-SCNC: 108 MMOL/L (ref 100–108)
CO2 SERPL-SCNC: 24 MMOL/L (ref 21–32)
CREAT SERPL-MCNC: 0.66 MG/DL (ref 0.6–1.3)
DIFFERENTIAL METHOD BLD: ABNORMAL
EOSINOPHIL # BLD: 0.2 K/UL (ref 0–0.4)
EOSINOPHIL NFR BLD: 3 % (ref 0–5)
ERYTHROCYTE [DISTWIDTH] IN BLOOD BY AUTOMATED COUNT: 12.7 % (ref 11.6–14.5)
GLUCOSE SERPL-MCNC: 86 MG/DL (ref 74–99)
HCT VFR BLD AUTO: 34.8 % (ref 35–45)
HGB BLD-MCNC: 11.7 G/DL (ref 12–16)
LYMPHOCYTES # BLD: 1.9 K/UL (ref 0.9–3.6)
LYMPHOCYTES NFR BLD: 32 % (ref 21–52)
MCH RBC QN AUTO: 28.2 PG (ref 24–34)
MCHC RBC AUTO-ENTMCNC: 33.6 G/DL (ref 31–37)
MCV RBC AUTO: 83.9 FL (ref 74–97)
MONOCYTES # BLD: 0.6 K/UL (ref 0.05–1.2)
MONOCYTES NFR BLD: 10 % (ref 3–10)
NEUTS SEG # BLD: 3.1 K/UL (ref 1.8–8)
NEUTS SEG NFR BLD: 54 % (ref 40–73)
PLATELET # BLD AUTO: 336 K/UL (ref 135–420)
PMV BLD AUTO: 9.1 FL (ref 9.2–11.8)
POTASSIUM SERPL-SCNC: 3.8 MMOL/L (ref 3.5–5.5)
RBC # BLD AUTO: 4.15 M/UL (ref 4.2–5.3)
SODIUM SERPL-SCNC: 139 MMOL/L (ref 136–145)
WBC # BLD AUTO: 5.7 K/UL (ref 4.6–13.2)

## 2018-12-09 PROCEDURE — 80048 BASIC METABOLIC PNL TOTAL CA: CPT

## 2018-12-09 PROCEDURE — 36415 COLL VENOUS BLD VENIPUNCTURE: CPT

## 2018-12-09 PROCEDURE — 74011250636 HC RX REV CODE- 250/636: Performed by: INTERNAL MEDICINE

## 2018-12-09 PROCEDURE — 85025 COMPLETE CBC W/AUTO DIFF WBC: CPT

## 2018-12-09 PROCEDURE — 74011000250 HC RX REV CODE- 250: Performed by: INTERNAL MEDICINE

## 2018-12-09 PROCEDURE — 77010033678 HC OXYGEN DAILY

## 2018-12-09 RX ORDER — LEVOFLOXACIN 750 MG/1
750 TABLET ORAL DAILY
Qty: 7 TAB | Refills: 0 | Status: SHIPPED | OUTPATIENT
Start: 2018-12-09 | End: 2018-12-16

## 2018-12-09 RX ORDER — ACETAMINOPHEN 500 MG
1000 TABLET ORAL
Qty: 60 TAB | Refills: 0 | Status: SHIPPED | OUTPATIENT
Start: 2018-12-09 | End: 2022-07-31

## 2018-12-09 RX ORDER — ACETAMINOPHEN 500 MG
1000 TABLET ORAL
Status: DISCONTINUED | OUTPATIENT
Start: 2018-12-09 | End: 2018-12-09 | Stop reason: HOSPADM

## 2018-12-09 RX ORDER — GUAIFENESIN 600 MG/1
600 TABLET, EXTENDED RELEASE ORAL 2 TIMES DAILY
Qty: 30 TAB | Refills: 0 | Status: SHIPPED | OUTPATIENT
Start: 2018-12-09 | End: 2022-07-31

## 2018-12-09 RX ADMIN — CEFTRIAXONE 1 G: 1 INJECTION, POWDER, FOR SOLUTION INTRAMUSCULAR; INTRAVENOUS at 04:54

## 2018-12-09 RX ADMIN — HYDROMORPHONE HYDROCHLORIDE 1 MG: 1 INJECTION, SOLUTION INTRAMUSCULAR; INTRAVENOUS; SUBCUTANEOUS at 02:27

## 2018-12-09 RX ADMIN — HYDROMORPHONE HYDROCHLORIDE 1 MG: 1 INJECTION, SOLUTION INTRAMUSCULAR; INTRAVENOUS; SUBCUTANEOUS at 12:41

## 2018-12-09 RX ADMIN — SODIUM CHLORIDE 500 MG: 900 INJECTION, SOLUTION INTRAVENOUS at 05:20

## 2018-12-09 RX ADMIN — ENOXAPARIN SODIUM 40 MG: 40 INJECTION SUBCUTANEOUS at 04:53

## 2018-12-09 NOTE — PROGRESS NOTES
Pt family member states the pt wants to leave. Paged MD on call to notify, awaiting a call back. Will continue to monitor.

## 2018-12-09 NOTE — PROGRESS NOTES
Hospitalist Progress Note    Patient: Andres Patel MRN: [id number]  CSN: [id number]    YOB: 1995  Age: 26 y.o. Sex: female    DOA: 12/7/2018 LOS:  LOS: 1 day          Chief Complaint: Chest pain secondary to pneumonia          Assessment/Plan     Patient Active Problem List   Diagnosis Code    Flank pain R10.9    Right middle lobe pneumonia ([address] J18.1    Intractable vomiting with nausea R11.2    Pneumonia J18.9        Clinical impression:    1. Right middle lobe pneumonia; persistent  2. Intractable nausea and vomiting; improved    -All studies reviewed, CT of the abdomen and pelvis, transvaginal ultrasound and prior provider documentation.  -Patient continues to feel for clinically seemingly disproportionate to her normal vital signs, oxygen saturations and heart rate. Patient likely does not require oxygen supplementation which should be weaned ASAP. Patient complains of headache and body aches. We'll start Tylenol 1 g every 6 hours as needed for pain, fever or headache. The patient was told to expect it to take the edge off and not provide complete resolution.  -Continue current therapy with antibiotics: Azithromycin and ceftriaxone  -Advance diet as tolerated    Disposition: once clinical improvement is acknowledged    Subjective:    Patient lying in the hospital bed with her significant other at the time of interview this morning. Patient continues to report feeling poorly. Presently she complains of headache and body aches. No events over night.          Review of systems:    Constitutional: + headache, denies fevers, chills, myalgias  Respiratory: denies SOB, +cough  Cardiovascular: +chest pain, palpitations  Gastrointestinal: improved nausea, vomiting, diarrhea      Vital signs/Intake and Output:  Visit Vitals  BP 95/47 (BP 1 Location: Left arm, BP Patient Position: At rest)   Pulse 72   Temp 97.9 °F (36.6 °C)   Resp 18   Ht 5' 3\" (1.6 m)   Wt 78.1 kg (172 lb 2.9 oz) SpO2 100%   BMI 30.50 kg/m²     Current Shift:  No intake/output data recorded. Last three shifts:  12/07 1901 - 12/09 0700  In: 4050 [I.V.:4050]  Out: -     Exam:    General: Well developed, alert, no respiratory distress, but mild general distress out of proportion to objective findings, OX3  Head/Neck: NCAT, supple, No masses, No lymphadenopathy  CVS:Regular rate and rhythm, no M/R/G, S1/S2 heard, no thrill  Lungs:Clear to auscultation bilaterally, no wheezes, rhonchi, or rales  Abdomen: Soft, Nontender, No distention, Normal Bowel sounds, No hepatomegaly  Extremities: No C/C/E, pulses palpable 2+  Skin:normal texture and turgor, no rashes, no lesions  Neuro:grossly normal , follows commands  Psych:appropriate                Labs: Results:       Chemistry Recent Labs     12/09/18  0311 12/08/18  0325 12/07/18  1735   GLU 86 79 85    137 139   K 3.8 3.4* 3.8    109* 109*   CO2 24 17* 20*   BUN 5* 6* 10   CREA 0.66 0.63 0.77   CA 8.5 7.9* 9.4   AGAP 7 11 10   BUCR 8 10 13   AP  --   --  74   TP  --   --  7.3   ALB  --   --  4.0   GLOB  --   --  3.3   AGRAT  --   --  1.2      CBC w/Diff Recent Labs     12/09/18  0311 12/07/18  1735   WBC 5.7 10.4   RBC 4.15* 4.48   HGB 11.7* 12.8   HCT 34.8* 37.2    362   GRANS 54 69   LYMPH 32 19*   EOS 3 2      Cardiac Enzymes Recent Labs     12/07/18  1735   *   CKND1 CALCULATION NOT PERFORMED WHEN RESULT IS BELOW LINEAR LIMIT      Coagulation Recent Labs     12/07/18  1735   PTP 12.9   INR 1.0   APTT 32.2       Lipid Panel No results found for: CHOL, CHOLPOCT, CHOLX, CHLST, CHOLV, 422321, HDL, LDL, LDLC, DLDLP, 776211, VLDLC, VLDL, TGLX, TRIGL, TRIGP, TGLPOCT, CHHD, CHHDX   BNP No results for input(s): BNPP in the last 72 hours.    Liver Enzymes Recent Labs     12/07/18  1735   TP 7.3   ALB 4.0   AP 74   SGOT 18      Thyroid Studies No results found for: T4, T3U, TSH, TSHEXT     Procedures/imaging: see electronic medical records for all procedures/Xrays and details which were not copied into this note but were reviewed prior to creation of Cecelia Trinh MD

## 2018-12-09 NOTE — ROUTINE PROCESS
19:30: Received verbal/bedside change of shift report from ANITHA Bower RN. Report included SBAR, KARDEX, MAR and recent results. 20:00: Received pt resting in bed with emesis bag in hand (no emesis visible) and apparently in mild distress. Off going RN reports that pt was resting quietly all shift with no C/O until visitors (boyfriend, relatives etc) began to arrive in room. Pt inquired as to the availability of PRN dilaudid and zofran (pt received both within the last two hours). No other concerns expressed. IVF infusing as ordered. Monitoring ongoing. 06:15: Pt slept soundly overnight with the exception of requests for PRN dilaudid IVP, at which time pt moaned loudly and rocked back and forth in bed. It should be noted that pt was usually asleep within five minutes of dilaudid administrations. Pt was also noted to disconnect her IVF X1 and shut down pump. Boyfriend in attendance with pt entire night. Bedside, Verbal and Written shift change report given to ANITHA Peck (oncoming nurse) by Daysi Elliott (offgoing nurse). Report included the following information SBAR, Kardex, MAR and Recent Results.

## 2018-12-09 NOTE — PROGRESS NOTES
Problem: Patient Education: Go to Patient Education Activity  Goal: Patient/Family Education  Outcome: Progressing Towards Goal  Pt remained free from falls this shift
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
## 2018-12-09 NOTE — DISCHARGE INSTRUCTIONS
Abdominal Pain: Care Instructions  Your Care Instructions    Abdominal pain has many possible causes. Some aren't serious and get better on their own in a few days. Others need more testing and treatment. If your pain continues or gets worse, you need to be rechecked and may need more tests to find out what is wrong. You may need surgery to correct the problem. Don't ignore new symptoms, such as fever, nausea and vomiting, urination problems, pain that gets worse, and dizziness. These may be signs of a more serious problem. Your doctor may have recommended a follow-up visit in the next 8 to 12 hours. If you are not getting better, you may need more tests or treatment. The doctor has checked you carefully, but problems can develop later. If you notice any problems or new symptoms, get medical treatment right away. Follow-up care is a key part of your treatment and safety. Be sure to make and go to all appointments, and call your doctor if you are having problems. It's also a good idea to know your test results and keep a list of the medicines you take. How can you care for yourself at home? · Rest until you feel better. · To prevent dehydration, drink plenty of fluids, enough so that your urine is light yellow or clear like water. Choose water and other caffeine-free clear liquids until you feel better. If you have kidney, heart, or liver disease and have to limit fluids, talk with your doctor before you increase the amount of fluids you drink. · If your stomach is upset, eat mild foods, such as rice, dry toast or crackers, bananas, and applesauce. Try eating several small meals instead of two or three large ones. · Wait until 48 hours after all symptoms have gone away before you have spicy foods, alcohol, and drinks that contain caffeine. · Do not eat foods that are high in fat. · Avoid anti-inflammatory medicines such as aspirin, ibuprofen (Advil, Motrin), and naproxen (Aleve).  These can cause stomach upset. Talk to your doctor if you take daily aspirin for another health problem. When should you call for help? Call 911 anytime you think you may need emergency care. For example, call if:    · You passed out (lost consciousness).     · You pass maroon or very bloody stools.     · You vomit blood or what looks like coffee grounds.     · You have new, severe belly pain.    Call your doctor now or seek immediate medical care if:    · Your pain gets worse, especially if it becomes focused in one area of your belly.     · You have a new or higher fever.     · Your stools are black and look like tar, or they have streaks of blood.     · You have unexpected vaginal bleeding.     · You have symptoms of a urinary tract infection. These may include:  ? Pain when you urinate. ? Urinating more often than usual.  ? Blood in your urine.     · You are dizzy or lightheaded, or you feel like you may faint.    Watch closely for changes in your health, and be sure to contact your doctor if:    · You are not getting better after 1 day (24 hours). Where can you learn more? Go to http://caitlin-susan.info/. Enter X075 in the search box to learn more about \"Abdominal Pain: Care Instructions. \"  Current as of: November 20, 2017  Content Version: 11.8  © 1233-3553 [employer]. Care instructions adapted under license by [employer] (which disclaims liability or warranty for this information). If you have questions about a medical condition or this instruction, always ask your healthcare professional. [place] [address] any warranty or liability for your use of this information. Nausea and Vomiting: Care Instructions  Your Care Instructions    When you are nauseated, you may feel weak and sweaty and notice a lot of saliva in your mouth. Nausea often leads to vomiting.  Most of the time you do not need to worry about nausea and vomiting, but they can be signs of other illnesses. Two common causes of nausea and vomiting are stomach flu and food poisoning. Nausea and vomiting from viral stomach flu will usually start to improve within 24 hours. Nausea and vomiting from food poisoning may last from 12 to 48 hours. The doctor has checked you carefully, but problems can develop later. If you notice any problems or new symptoms, get medical treatment right away. Follow-up care is a key part of your treatment and safety. Be sure to make and go to all appointments, and call your doctor if you are having problems. It's also a good idea to know your test results and keep a list of the medicines you take. How can you care for yourself at home? · To prevent dehydration, drink plenty of fluids, enough so that your urine is light yellow or clear like water. Choose water and other caffeine-free clear liquids until you feel better. If you have kidney, heart, or liver disease and have to limit fluids, talk with your doctor before you increase the amount of fluids you drink. · Rest in bed until you feel better. · When you are able to eat, try clear soups, mild foods, and liquids until all symptoms are gone for 12 to 48 hours. Other good choices include dry toast, crackers, cooked cereal, and gelatin dessert, such as Jell-O. When should you call for help? Call 911 anytime you think you may need emergency care. For example, call if:    · You passed out (lost consciousness).    Call your doctor now or seek immediate medical care if:    · You have symptoms of dehydration, such as:  ? Dry eyes and a dry mouth. ? Passing only a little dark urine. ?  Feeling thirstier than usual.     · You have new or worsening belly pain.     · You have a new or higher fever.     · You vomit blood or what looks like coffee grounds.    Watch closely for changes in your health, and be sure to contact your doctor if:    · You have ongoing nausea and vomiting.     · Your vomiting is getting worse.     · Your vomiting lasts longer than 2 days.     · You are not getting better as expected. Where can you learn more? Go to http://caitlin-susan.info/. Enter 25 [id number] in the search box to learn more about \"Nausea and Vomiting: Care Instructions. \"  Current as of: November 20, 2017  Content Version: 11.8  © 3864-2623 [employer]. Care instructions adapted under license by [employer] (which disclaims liability or warranty for this information). If you have questions about a medical condition or this instruction, always ask your healthcare professional. [address] any warranty or liability for your use of this information. Pneumonia in Teens: Care Instructions  Your Care Instructions    Pneumonia is an infection of the lungs. Most cases are caused by infections from bacteria or viruses. Pneumonia may be mild or very severe. If it is caused by bacteria, you will be treated with antibiotics. It might take a week to a few months to recover fully from pneumonia. This depends on how sick you were and whether your overall health is good. Follow-up care is a key part of your treatment and safety. Be sure to make and go to all appointments, and call your doctor if you are having problems. It's also a good idea to know your test results and keep a list of the medicines you take. How can you care for yourself at home? · If your doctor prescribed antibiotics, take them as directed. Do not stop taking the medicine just because you are feeling better. You need to take the full course of antibiotics to avoid getting sick again. · Be safe with medicines. Take your medicines exactly as prescribed. For example, your doctor may have given you medicine that makes breathing easier. Call your doctor if you think you are having a problem with your medicine. · Get plenty of rest and sleep.  You may feel weak and tired for a while, but your energy level will improve with time.  · To prevent dehydration, drink plenty of fluids, enough so that your urine is light yellow or clear like water. Choose water and other caffeine-free clear liquids until you feel better. If you have kidney, heart, or liver disease and have to limit fluids, talk with your doctor before you increase the amount of fluids you drink. · Take care of your cough so you can rest. A cough that brings up mucus from your lungs is common with pneumonia. It is one way your body gets rid of the infection. But if a cough keeps you from resting or causes severe fatigue and chest-wall pain, talk to your doctor. He or she may suggest that you take a medicine to reduce the cough. · Use a vaporizer or humidifier to add moisture to your bedroom. Follow the directions for cleaning the machine. Dry air makes coughing worse. · Do not smoke or allow others to smoke around you. Smoke will make your cough last longer. If you need help quitting, talk to your doctor about stop-smoking programs and medicines. These can increase your chances of quitting for good. · Take an over-the-counter pain medicine, such as acetaminophen (Tylenol), ibuprofen (Advil, Motrin), or naproxen (Aleve). Read and follow all instructions on the label. No one younger than 20 should take aspirin. It has been linked to Reye syndrome, a serious illness. · Be careful when taking over-the-counter cold or flu medicines and Tylenol at the same time. Many of these medicines have acetaminophen, which is Tylenol. Read the labels to make sure that you are not taking more than the recommended dose. Too much acetaminophen (Tylenol) can be harmful. · If you were given a spirometer to measure how well your lungs are working, use it as instructed. This can help your doctor tell how your recovery is going. · To prevent pneumonia in the future, talk to your doctor about getting a flu vaccine every year. When should you call for help?   Call 911 anytime you think you may need emergency care. For example, call if:    · You have severe trouble breathing.    Call your doctor now or seek immediate medical care if:    · You have new or worse trouble breathing.     · You cough up dark brown or bloody mucus (sputum).     · You have a new or higher fever.     · You have a new rash.    Watch closely for changes in your health, and be sure to contact your doctor if:    · You cough more deeply or more often, especially if you notice more mucus or a change in the color of your mucus.     · You do not get better as expected. Where can you learn more? Go to http://caitlin-susan.info/. Enter [id number] [id number] in the search box to learn more about \"Pneumonia in Teens: Care Instructions. \"  Current as of: December 6, 2017  Content Version: 11.8  © 7312-2669 [employer]. Care instructions adapted under license by [employer] (which disclaims liability or warranty for this information). If you have questions about a medical condition or this instruction, always ask your healthcare professional. [place] [address] any warranty or liability for your use of this information.

## 2018-12-10 LAB
C TRACH RRNA SPEC QL NAA+PROBE: POSITIVE
N GONORRHOEA RRNA SPEC QL NAA+PROBE: NEGATIVE
SPECIMEN SOURCE: ABNORMAL

## 2018-12-12 NOTE — CALL BACK NOTE
8: 57 AM  12/12/2018    Reviewed discharge note. On levaquin 500 mg x 7 days for CAP. GC/C cultures positive for chlamydia. Proper coverage but need to be notified of + result. Called contact number. No answer. Just rings with no answer. Called emergency contact. No answer. No VM set up. Will send certified letter.  Given to [facility], [place]

## 2018-12-14 LAB
BACTERIA SPEC CULT: NORMAL
SERVICE CMNT-IMP: NORMAL

## 2018-12-31 LAB
ATRIAL RATE: 77 BPM
CALCULATED P AXIS, ECG09: 68 DEGREES
CALCULATED R AXIS, ECG10: 76 DEGREES
CALCULATED T AXIS, ECG11: 61 DEGREES
DIAGNOSIS, 93000: NORMAL
P-R INTERVAL, ECG05: 184 MS
Q-T INTERVAL, ECG07: 392 MS
QRS DURATION, ECG06: 88 MS
QTC CALCULATION (BEZET), ECG08: 443 MS
VENTRICULAR RATE, ECG03: 77 BPM

## 2022-07-31 ENCOUNTER — APPOINTMENT (OUTPATIENT)
Dept: CT IMAGING | Age: 27
End: 2022-07-31
Attending: FAMILY MEDICINE
Payer: MEDICAID

## 2022-07-31 ENCOUNTER — APPOINTMENT (OUTPATIENT)
Dept: CT IMAGING | Age: 27
End: 2022-07-31
Attending: EMERGENCY MEDICINE
Payer: MEDICAID

## 2022-07-31 ENCOUNTER — HOSPITAL ENCOUNTER (OUTPATIENT)
Age: 27
Setting detail: OBSERVATION
Discharge: HOME | End: 2022-07-31
Attending: EMERGENCY MEDICINE | Admitting: FAMILY MEDICINE
Payer: MEDICAID

## 2022-07-31 ENCOUNTER — APPOINTMENT (OUTPATIENT)
Dept: MRI IMAGING | Age: 27
End: 2022-07-31
Attending: EMERGENCY MEDICINE
Payer: MEDICAID

## 2022-07-31 VITALS
TEMPERATURE: 98 F | DIASTOLIC BLOOD PRESSURE: 83 MMHG | SYSTOLIC BLOOD PRESSURE: 122 MMHG | RESPIRATION RATE: 18 BRPM | OXYGEN SATURATION: 99 % | HEART RATE: 61 BPM | BODY MASS INDEX: 30.47 KG/M2 | WEIGHT: 172 LBS

## 2022-07-31 DIAGNOSIS — R53.1 LEFT-SIDED WEAKNESS: Primary | ICD-10-CM

## 2022-07-31 PROBLEM — R47.9 SPEECH ABNORMALITY: Status: ACTIVE | Noted: 2022-07-31

## 2022-07-31 PROBLEM — J18.9 PNEUMONIA: Status: RESOLVED | Noted: 2018-12-08 | Resolved: 2022-07-31

## 2022-07-31 PROBLEM — J18.9 RIGHT MIDDLE LOBE PNEUMONIA: Status: RESOLVED | Noted: 2018-12-08 | Resolved: 2022-07-31

## 2022-07-31 PROBLEM — R10.9 FLANK PAIN: Status: RESOLVED | Noted: 2018-12-08 | Resolved: 2022-07-31

## 2022-07-31 PROBLEM — R11.2 INTRACTABLE VOMITING WITH NAUSEA: Status: RESOLVED | Noted: 2018-12-08 | Resolved: 2022-07-31

## 2022-07-31 LAB
ALBUMIN SERPL-MCNC: 3.8 G/DL (ref 3.4–5)
ALBUMIN/GLOB SERPL: 1.1 {RATIO} (ref 0.8–1.7)
ALP SERPL-CCNC: 90 U/L (ref 45–117)
ALT SERPL-CCNC: 26 U/L (ref 13–56)
AMPHET UR QL SCN: NEGATIVE
ANION GAP SERPL CALC-SCNC: 10 MMOL/L (ref 3–18)
APPEARANCE UR: CLEAR
APTT PPP: 25.8 SEC (ref 23–36.4)
AST SERPL-CCNC: 23 U/L (ref 10–38)
BACTERIA URNS QL MICRO: ABNORMAL /HPF
BARBITURATES UR QL SCN: NEGATIVE
BASOPHILS # BLD: 0 K/UL (ref 0–0.1)
BASOPHILS NFR BLD: 0 % (ref 0–2)
BENZODIAZ UR QL: NEGATIVE
BILIRUB SERPL-MCNC: 0.3 MG/DL (ref 0.2–1)
BILIRUB UR QL: NEGATIVE
BUN SERPL-MCNC: 6 MG/DL (ref 7–18)
BUN/CREAT SERPL: 7 (ref 12–20)
CALCIUM SERPL-MCNC: 9.1 MG/DL (ref 8.5–10.1)
CANNABINOIDS UR QL SCN: POSITIVE
CHLORIDE SERPL-SCNC: 110 MMOL/L (ref 100–111)
CK SERPL-CCNC: 228 U/L (ref 26–192)
CO2 SERPL-SCNC: 23 MMOL/L (ref 21–32)
COCAINE UR QL SCN: NEGATIVE
COLOR UR: YELLOW
CREAT SERPL-MCNC: 0.82 MG/DL (ref 0.6–1.3)
DIFFERENTIAL METHOD BLD: ABNORMAL
EOSINOPHIL # BLD: 0.1 K/UL (ref 0–0.4)
EOSINOPHIL NFR BLD: 2 % (ref 0–5)
EPITH CASTS URNS QL MICRO: ABNORMAL /LPF (ref 0–5)
ERYTHROCYTE [DISTWIDTH] IN BLOOD BY AUTOMATED COUNT: 13.5 % (ref 11.6–14.5)
ETHANOL SERPL-MCNC: 60 MG/DL (ref 0–3)
FOLATE SERPL-MCNC: 2.9 NG/ML (ref 3.1–17.5)
GLOBULIN SER CALC-MCNC: 3.4 G/DL (ref 2–4)
GLUCOSE BLD STRIP.AUTO-MCNC: 88 MG/DL (ref 70–110)
GLUCOSE BLD STRIP.AUTO-MCNC: 92 MG/DL (ref 70–110)
GLUCOSE BLD STRIP.AUTO-MCNC: 95 MG/DL (ref 70–110)
GLUCOSE SERPL-MCNC: 100 MG/DL (ref 74–99)
GLUCOSE UR STRIP.AUTO-MCNC: NEGATIVE MG/DL
HCG SERPL-ACNC: <1 MIU/ML (ref 0–10)
HCT VFR BLD AUTO: 41.7 % (ref 35–45)
HDSCOM,HDSCOM: ABNORMAL
HGB BLD-MCNC: 14 G/DL (ref 12–16)
HGB UR QL STRIP: NEGATIVE
IMM GRANULOCYTES # BLD AUTO: 0 K/UL (ref 0–0.04)
IMM GRANULOCYTES NFR BLD AUTO: 0 % (ref 0–0.5)
INR PPP: 1 (ref 0.8–1.2)
KETONES UR QL STRIP.AUTO: NEGATIVE MG/DL
LEUKOCYTE ESTERASE UR QL STRIP.AUTO: ABNORMAL
LYMPHOCYTES # BLD: 3.6 K/UL (ref 0.9–3.6)
LYMPHOCYTES NFR BLD: 40 % (ref 21–52)
MAGNESIUM SERPL-MCNC: 2.2 MG/DL (ref 1.6–2.6)
MCH RBC QN AUTO: 28.9 PG (ref 24–34)
MCHC RBC AUTO-ENTMCNC: 33.6 G/DL (ref 31–37)
MCV RBC AUTO: 86 FL (ref 78–100)
METHADONE UR QL: NEGATIVE
MONOCYTES # BLD: 0.8 K/UL (ref 0.05–1.2)
MONOCYTES NFR BLD: 9 % (ref 3–10)
NEUTS SEG # BLD: 4.4 K/UL (ref 1.8–8)
NEUTS SEG NFR BLD: 49 % (ref 40–73)
NITRITE UR QL STRIP.AUTO: NEGATIVE
NRBC # BLD: 0 K/UL (ref 0–0.01)
NRBC BLD-RTO: 0 PER 100 WBC
OPIATES UR QL: NEGATIVE
PCP UR QL: NEGATIVE
PH UR STRIP: 7 [PH] (ref 5–8)
PLATELET # BLD AUTO: 463 K/UL (ref 135–420)
PMV BLD AUTO: 8.9 FL (ref 9.2–11.8)
POTASSIUM SERPL-SCNC: 3.9 MMOL/L (ref 3.5–5.5)
PROT SERPL-MCNC: 7.2 G/DL (ref 6.4–8.2)
PROT UR STRIP-MCNC: NEGATIVE MG/DL
PROTHROMBIN TIME: 13 SEC (ref 11.5–15.2)
RBC # BLD AUTO: 4.85 M/UL (ref 4.2–5.3)
RBC #/AREA URNS HPF: ABNORMAL /HPF (ref 0–5)
SODIUM SERPL-SCNC: 143 MMOL/L (ref 136–145)
SP GR UR REFRACTOMETRY: 1.01 (ref 1–1.03)
TROPONIN-HIGH SENSITIVITY: <3 NG/L (ref 0–54)
UROBILINOGEN UR QL STRIP.AUTO: 0.2 EU/DL (ref 0.2–1)
VIT B12 SERPL-MCNC: 218 PG/ML (ref 211–911)
WBC # BLD AUTO: 9 K/UL (ref 4.6–13.2)
WBC URNS QL MICRO: ABNORMAL /HPF (ref 0–5)

## 2022-07-31 PROCEDURE — 70551 MRI BRAIN STEM W/O DYE: CPT

## 2022-07-31 PROCEDURE — 82962 GLUCOSE BLOOD TEST: CPT

## 2022-07-31 PROCEDURE — 82607 VITAMIN B-12: CPT

## 2022-07-31 PROCEDURE — G0378 HOSPITAL OBSERVATION PER HR: HCPCS

## 2022-07-31 PROCEDURE — 74011250636 HC RX REV CODE- 250/636: Performed by: EMERGENCY MEDICINE

## 2022-07-31 PROCEDURE — 85610 PROTHROMBIN TIME: CPT

## 2022-07-31 PROCEDURE — 82550 ASSAY OF CK (CPK): CPT

## 2022-07-31 PROCEDURE — 85025 COMPLETE CBC W/AUTO DIFF WBC: CPT

## 2022-07-31 PROCEDURE — 74011250636 HC RX REV CODE- 250/636: Performed by: FAMILY MEDICINE

## 2022-07-31 PROCEDURE — 74011250637 HC RX REV CODE- 250/637: Performed by: FAMILY MEDICINE

## 2022-07-31 PROCEDURE — 96374 THER/PROPH/DIAG INJ IV PUSH: CPT

## 2022-07-31 PROCEDURE — 70450 CT HEAD/BRAIN W/O DYE: CPT

## 2022-07-31 PROCEDURE — 84702 CHORIONIC GONADOTROPIN TEST: CPT

## 2022-07-31 PROCEDURE — 84425 ASSAY OF VITAMIN B-1: CPT

## 2022-07-31 PROCEDURE — 82077 ASSAY SPEC XCP UR&BREATH IA: CPT

## 2022-07-31 PROCEDURE — 85730 THROMBOPLASTIN TIME PARTIAL: CPT

## 2022-07-31 PROCEDURE — 74011000636 HC RX REV CODE- 636: Performed by: FAMILY MEDICINE

## 2022-07-31 PROCEDURE — 81001 URINALYSIS AUTO W/SCOPE: CPT

## 2022-07-31 PROCEDURE — 80053 COMPREHEN METABOLIC PANEL: CPT

## 2022-07-31 PROCEDURE — 99285 EMERGENCY DEPT VISIT HI MDM: CPT

## 2022-07-31 PROCEDURE — 80307 DRUG TEST PRSMV CHEM ANLYZR: CPT

## 2022-07-31 PROCEDURE — 84484 ASSAY OF TROPONIN QUANT: CPT

## 2022-07-31 PROCEDURE — 83735 ASSAY OF MAGNESIUM: CPT

## 2022-07-31 PROCEDURE — 70496 CT ANGIOGRAPHY HEAD: CPT

## 2022-07-31 RX ORDER — DIAZEPAM 10 MG/2ML
5 INJECTION INTRAMUSCULAR
Status: COMPLETED | OUTPATIENT
Start: 2022-07-31 | End: 2022-07-31

## 2022-07-31 RX ORDER — ASPIRIN 325 MG
325 TABLET ORAL ONCE
Status: COMPLETED | OUTPATIENT
Start: 2022-07-31 | End: 2022-07-31

## 2022-07-31 RX ORDER — ACETAMINOPHEN 650 MG/1
650 SUPPOSITORY RECTAL
Status: DISCONTINUED | OUTPATIENT
Start: 2022-07-31 | End: 2022-07-31 | Stop reason: HOSPADM

## 2022-07-31 RX ORDER — ONDANSETRON 2 MG/ML
4 INJECTION INTRAMUSCULAR; INTRAVENOUS
Status: DISCONTINUED | OUTPATIENT
Start: 2022-07-31 | End: 2022-07-31 | Stop reason: HOSPADM

## 2022-07-31 RX ORDER — SODIUM CHLORIDE 9 MG/ML
100 INJECTION, SOLUTION INTRAVENOUS CONTINUOUS
Status: DISCONTINUED | OUTPATIENT
Start: 2022-07-31 | End: 2022-07-31 | Stop reason: HOSPADM

## 2022-07-31 RX ORDER — ACETAMINOPHEN 325 MG/1
650 TABLET ORAL
Status: DISCONTINUED | OUTPATIENT
Start: 2022-07-31 | End: 2022-07-31 | Stop reason: HOSPADM

## 2022-07-31 RX ORDER — GUAIFENESIN 100 MG/5ML
81 LIQUID (ML) ORAL DAILY
Status: DISCONTINUED | OUTPATIENT
Start: 2022-08-01 | End: 2022-07-31 | Stop reason: HOSPADM

## 2022-07-31 RX ADMIN — ASPIRIN 325 MG ORAL TABLET 325 MG: 325 PILL ORAL at 10:12

## 2022-07-31 RX ADMIN — IOPAMIDOL 100 ML: 755 INJECTION, SOLUTION INTRAVENOUS at 11:07

## 2022-07-31 RX ADMIN — DIAZEPAM 5 MG: 5 INJECTION, SOLUTION INTRAMUSCULAR; INTRAVENOUS at 09:34

## 2022-07-31 RX ADMIN — SODIUM CHLORIDE 100 ML/HR: 9 INJECTION, SOLUTION INTRAVENOUS at 10:12

## 2022-07-31 NOTE — H&P
History & Physical    Patient: Festus Alejandro MRN: [id number]  CSN: [id number]    YOB: 1995  Age: 27 y.o. Sex: female      DOA: 7/31/2022  Primary Care Provider:  Gerardo Ulloa MD    Assessment/Plan   27year old female with admitted with left sided weakness with facial droop and speech abnormalities. Will be admitted for observation for possible neurological abnormality: stroke vs complex migraine vs demyelinating disease. Neurological abnormality-  Sroke vs complex migraine vs demyelinating disease. MRI brain pending   CTA head and neck ordered   UDS pending   Discussed with Dr. Parag Sullivan, neurology   Admit to telemetry floor for cardiac monitoring  Aspirin full dose given now, 81 mg p.o. daily  neurochecks per stroke protocol  IV normal saline continuous infusion 100 cc/h  Euthymia with acetaminophen 650 mg every 6 hours as needed fever  Avoid urinary catheters  SCDs and DVT prophylaxis  PT/OT and SLP consults  Neurology consult placed by ED, Dr. Parag Sullivan, appreciate his expertise     CODE STATUS full     Patient Active Problem List   Diagnosis Code    Left-sided weakness R53.1    Speech abnormality R47.9     Estimated length of stay: 2-3 days     CC: left sided weakness and numbnesswith facial droop and speech abnormalities        HPI:     Festus Alejandro is a 27 y.o. female without significant past medical history presents to the ED, brought in by boyfriend, with complaining of left-sided weakness and numbness. Her boyfriend states they went to bed at approximately 2 AM and the patient was in her normal state of health. Boyfriend states woke up approximately 20 minutes prior to ED arrival and noticed patient with slurred speech, left-sided facial droop, weakness and numbness to left side. Patient somewhat anxious at arrival, has obvious tremors generalized, his obvious left facial droop and is very slow in low speech difficult to understand.   She admits to a mild headache and numbness of left side. She denies chest or abdomen pain. She does admit to alcohol last night but no drugs. Past Medical History:   Diagnosis Date    Flank pain 2018    Intractable vomiting with nausea 2018    Pneumonia 2018    Right middle lobe pneumonia 2018       No past surgical history on file. No family history on file. Social History     Socioeconomic History    Marital status: SINGLE   Tobacco Use    Smoking status: Never    Smokeless tobacco: Never   Substance and Sexual Activity    Alcohol use: No       Prior to Admission medications    Medication Sig Start Date End Date Taking? Authorizing Provider   acetaminophen (TYLENOL) 500 mg tablet Take 2 Tabs by mouth every six (6) hours as needed. 18   Read MD Olive   guaiFENesin ER (MUCINEX) 600 mg ER tablet Take 1 Tab by mouth two (2) times a day. 18   Read MD Olive       Allergies   Allergen Reactions    Pcn [Penicillins] Rash             Review of Systems  Gen: No fever, chills, malaise, weight loss/gain. Heent: No headache, rhinorrhea, epistaxis, ear pain, hearing loss, sinus pain, neck pain/stiffness, sore throat. Heart: No chest pain, palpitations, CONTE, pnd, or orthopnea. Resp: No cough, hemoptysis, wheezing and shortness of breath. GI: No nausea, vomiting, diarrhea, constipation, melena or hematochezia. : No urinary obstruction, dysuria or hematuria. Derm: No rash, new skin lesion or pruritis. Musc/skeletal: no bone or joint complains. Vasc: No edema, cyanosis or claudication. Endo: No heat/cold intolerance, no polyuria,polydipsia or polyphagia. Neuro: No unilateral weakness, numbness, tingling. No seizures. Heme: No easy bruising or bleeding.     Physical Exam:     Physical Exam:  Visit Vitals  /83   Pulse 61   Temp 98 °F (36.7 °C)   Resp 18   Wt 78 kg (172 lb)   SpO2 99%   BMI 30.47 kg/m²      O2 Device: None (Room air)    Temp (24hrs), Av.7 °F (36.5 °C), Min:97.3 °F (36.3 °C), Max:98 °F (36.7 °C)    No intake/output data recorded. No intake/output data recorded. General:  Awake, cooperative, no distress, drowsy, very sleepy    Head:  Normocephalic, without obvious abnormality, atraumatic. Eyes:  Conjunctivae/corneas clear, sclera anicteric, PERRL, EOMs intact. Nose: Nares normal. No drainage or sinus tenderness. Throat: Lips, mucosa, and tongue normal.    Neck: Supple, symmetrical, trachea midline, no adenopathy. Lungs:   Clear to auscultation bilaterally. Heart:  Regular rate and rhythm, S1, S2 normal, no murmur, click, rub or gallop. Abdomen: Soft, non-tender. Bowel sounds normal. No masses,  No organomegaly. Extremities: Extremities normal, atraumatic, no cyanosis or edema. Capillary refill normal.   Pulses: 2+ and symmetric all extremities. Skin: Skin color pink/pale/mottled/flushed, turgor normal. No rashes or lesions   Neurologic: CNII-XII intact. No focal motor or sensory deficit. Labs Reviewed:    Lab results reviewed. For significant abnormal values and values requiring intervention, see assessment and plan. Recent Results (from the past 24 hour(s))   GLUCOSE, POC    Collection Time: 07/31/22  4:45 AM   Result Value Ref Range    Glucose (POC) 95 70 - 110 mg/dL   CBC WITH AUTOMATED DIFF    Collection Time: 07/31/22  4:50 AM   Result Value Ref Range    WBC 9.0 4.6 - 13.2 K/uL    RBC 4.85 4.20 - 5.30 M/uL    HGB 14.0 12.0 - 16.0 g/dL    HCT 41.7 35.0 - 45.0 %    MCV 86.0 78.0 - 100.0 FL    MCH 28.9 24.0 - 34.0 PG    MCHC 33.6 31.0 - 37.0 g/dL    RDW 13.5 11.6 - 14.5 %    PLATELET 966 (H) 876 - 420 K/uL    MPV 8.9 (L) 9.2 - 11.8 FL    NRBC 0.0 0  WBC    ABSOLUTE NRBC 0.00 0.00 - 0.01 K/uL    NEUTROPHILS 49 40 - 73 %    LYMPHOCYTES 40 21 - 52 %    MONOCYTES 9 3 - 10 %    EOSINOPHILS 2 0 - 5 %    BASOPHILS 0 0 - 2 %    IMMATURE GRANULOCYTES 0 0.0 - 0.5 %    ABS. NEUTROPHILS 4.4 1.8 - 8.0 K/UL    ABS.  LYMPHOCYTES 3.6 0.9 - 3.6 K/UL ABS. MONOCYTES 0.8 0.05 - 1.2 K/UL    ABS. EOSINOPHILS 0.1 0.0 - 0.4 K/UL    ABS. BASOPHILS 0.0 0.0 - 0.1 K/UL    ABS. IMM. GRANS. 0.0 0.00 - 0.04 K/UL    DF AUTOMATED     PROTHROMBIN TIME + INR    Collection Time: 07/31/22  4:50 AM   Result Value Ref Range    Prothrombin time 13.0 11.5 - 15.2 sec    INR 1.0 0.8 - 1.2     METABOLIC PANEL, COMPREHENSIVE    Collection Time: 07/31/22  4:50 AM   Result Value Ref Range    Sodium 143 136 - 145 mmol/L    Potassium 3.9 3.5 - 5.5 mmol/L    Chloride 110 100 - 111 mmol/L    CO2 23 21 - 32 mmol/L    Anion gap 10 3.0 - 18 mmol/L    Glucose 100 (H) 74 - 99 mg/dL    BUN 6 (L) 7.0 - 18 MG/DL    Creatinine 0.82 0.6 - 1.3 MG/DL    BUN/Creatinine ratio 7 (L) 12 - 20      GFR est AA >60 >60 ml/min/1.73m2    GFR est non-AA >60 >60 ml/min/1.73m2    Calcium 9.1 8.5 - 10.1 MG/DL    Bilirubin, total 0.3 0.2 - 1.0 MG/DL    ALT (SGPT) 26 13 - 56 U/L    AST (SGOT) 23 10 - 38 U/L    Alk.  phosphatase 90 45 - 117 U/L    Protein, total 7.2 6.4 - 8.2 g/dL    Albumin 3.8 3.4 - 5.0 g/dL    Globulin 3.4 2.0 - 4.0 g/dL    A-G Ratio 1.1 0.8 - 1.7     PTT    Collection Time: 07/31/22  4:50 AM   Result Value Ref Range    aPTT 25.8 23.0 - 36.4 SEC   ETHYL ALCOHOL    Collection Time: 07/31/22  4:50 AM   Result Value Ref Range    ALCOHOL(ETHYL),SERUM 60 (H) 0 - 3 MG/DL   BETA HCG, QT    Collection Time: 07/31/22  4:50 AM   Result Value Ref Range    Beta HCG, QT <1 0 - 10 MIU/ML   TROPONIN-HIGH SENSITIVITY    Collection Time: 07/31/22  4:50 AM   Result Value Ref Range    Troponin-High Sensitivity <3 0 - 54 ng/L   MAGNESIUM    Collection Time: 07/31/22  4:50 AM   Result Value Ref Range    Magnesium 2.2 1.6 - 2.6 mg/dL   CK    Collection Time: 07/31/22  4:50 AM   Result Value Ref Range     (H) 26 - 192 U/L   URINALYSIS W/ RFLX MICROSCOPIC    Collection Time: 07/31/22  7:40 AM   Result Value Ref Range    Color YELLOW      Appearance CLEAR      Specific gravity 1.015 1.005 - 1.030      pH (UA) 7.0 5.0 - 8.0      Protein Negative NEG mg/dL    Glucose Negative NEG mg/dL    Ketone Negative NEG mg/dL    Bilirubin Negative NEG      Blood Negative NEG      Urobilinogen 0.2 0.2 - 1.0 EU/dL    Nitrites Negative NEG      Leukocyte Esterase SMALL (A) NEG     DRUG SCREEN, URINE    Collection Time: 07/31/22  7:40 AM   Result Value Ref Range    BENZODIAZEPINES Negative NEG      BARBITURATES Negative NEG      THC (TH-CANNABINOL) Positive (A) NEG      OPIATES Negative NEG      PCP(PHENCYCLIDINE) Negative NEG      COCAINE Negative NEG      AMPHETAMINES Negative NEG      METHADONE Negative NEG      HDSCOM (NOTE)    URINE MICROSCOPIC ONLY    Collection Time: 07/31/22  7:40 AM   Result Value Ref Range    WBC 4 to 10 0 - 5 /hpf    RBC 0 to 3 0 - 5 /hpf    Epithelial cells 1+ 0 - 5 /lpf    Bacteria 1+ (A) NEG /hpf   GLUCOSE, POC    Collection Time: 07/31/22 10:55 AM   Result Value Ref Range    Glucose (POC) 88 70 - 110 mg/dL   GLUCOSE, POC    Collection Time: 07/31/22  2:31 PM   Result Value Ref Range    Glucose (POC) 92 70 - 110 mg/dL       Procedures/imaging: see electronic medical records for all procedures/Xrays and details which were not copied into this note but were reviewed prior to creation of Plan      CC: Yeimi Serrano MD

## 2022-07-31 NOTE — ED NOTES
This Rn updated the patient and the patients family at this time on the MD's advice. The patient was unable to answer me and keep her eyes open while I updated them. The patient stated she did not care if she stayed or went home. She stated \"I'm going back to sleep\". The patients mother insisted the patient was just \"up and alert and saying she wanted to be sent home\". This RN went to get Yemi Anaya RN the Ed charge nurse and have her come witness and assess the patients current mental state as she did not appear to be able to wake up and coherently make her own decisions. Yemi Anaya RN spoke to the patients family and informed them on the patients ability to leave AMA, she attempted to educated the patient on her right and ability to leave AMA if she desired to. The patient was unable to keep her eyes open during Elaine's education. The patient was able to repeat back to Yemi Anaya that she understood what Against Medical advice meant. The patient had her eyes closed and responded with \"Okay\" after each question asked to her by both Julian Oneil and the patients mother. This Rn will update the MD and continue to monitor.

## 2022-07-31 NOTE — DISCHARGE SUMMARY
Discharge Summary    Patient: Brandy White MRN: [id number]  CSN: [id number]    YOB: 1995  Age: 27 y.o. Sex: female    DOA: 7/31/2022 LOS:  LOS: 0 days   Discharge Date:      Primary Care Provider:  Jose Francisco Brantley MD    Admission Diagnoses: Left-sided weakness [R53.1]    Discharge Diagnoses:    Problem List as of 7/31/2022 Date Reviewed: 12/8/2018            Codes Class Noted - Resolved    * (Principal) Left-sided weakness ICD-10-CM: R53.1  ICD-9-CM: 728.87  7/31/2022 - Present        Speech abnormality ICD-10-CM: R47.9  ICD-9-CM: 784.59  7/31/2022 - Present        RESOLVED: Flank pain ICD-10-CM: R10.9  ICD-9-CM: 789.09  12/8/2018 - 7/31/2022        RESOLVED: Right middle lobe pneumonia ICD-10-CM: J18.9  ICD-9-CM: 326  12/8/2018 - 7/31/2022        RESOLVED: Intractable vomiting with nausea ICD-10-CM: R11.2  ICD-9-CM: 536.2  12/8/2018 - 7/31/2022        RESOLVED: Pneumonia ICD-10-CM: J18.9  ICD-9-CM: 486  12/8/2018 - 7/31/2022           Discharge Medications:     Current Discharge Medication List        STOP taking these medications       acetaminophen (TYLENOL) 500 mg tablet Comments:   Reason for Stopping:         guaiFENesin ER (MUCINEX) 600 mg ER tablet Comments:   Reason for Stopping:               Discharge Condition: Stable    Procedures : none    Consults: Neurology      PHYSICAL EXAM   Visit Vitals  /83   Pulse 61   Temp 98 °F (36.7 °C)   Resp 18   Wt 78 kg (172 lb)   SpO2 99%   BMI 30.47 kg/m²     General: Awake, cooperative, no acute distress    HEENT: NC, Atraumatic. PERRLA, EOMI. Anicteric sclerae. Lungs:  CTA Bilaterally. No Wheezing/Rhonchi/Rales. Heart:  Regular  rhythm,  No murmur, No Rubs, No Gallops  Abdomen: Soft, Non distended, Non tender. +Bowel sounds,   Extremities: No c/c/e  Psych:   Not anxious or agitated. Neurologic:  No acute neurological deficits.                                  Admission HPI : Brandy White is a 27 y.o. female without significant past medical history presents to the ED, brought in by elio, with complaining of left-sided weakness and numbness. Her boyfriend states they went to bed at approximately 2 AM and the patient was in her normal state of health. Boyfriend states woke up approximately 20 minutes prior to ED arrival and noticed patient with slurred speech, left-sided facial droop, weakness and numbness to left side. Patient somewhat anxious at arrival, has obvious tremors generalized, his obvious left facial droop and is very slow in low speech difficult to understand. She admits to a mild headache and numbness of left side. She denies chest or abdomen pain. She does admit to alcohol last night but no drugs. Hospital Course : Admitted to telemetry unit for for stroke. The inpatient neurologist advised that her clinical presentation is most suggestive of atypical complex migraines. At the time of discharge, the patient is currently asymptomatic. The intermittent nature of the symptoms is more suggestive of migraines other than TIA or a stroke. Another possibility would be somatization disorder due to underlying stressors or anxiety. Brain MRI was negative.      Activity: Activity as tolerated and no driving for today    Diet: Regular Diet    Follow-up: with PCP in 3-5 days    Disposition: Home with mother    Minutes spent on discharge: 35 minutes      Labs: Results:       Chemistry Recent Labs     07/31/22  0450   *      K 3.9      CO2 23   BUN 6*   CREA 0.82   CA 9.1   AGAP 10   BUCR 7*   AP 90   TP 7.2   ALB 3.8   GLOB 3.4   AGRAT 1.1      CBC w/Diff Recent Labs     07/31/22  0450   WBC 9.0   RBC 4.85   HGB 14.0   HCT 41.7   *   GRANS 49   LYMPH 40   EOS 2      Cardiac Enzymes Recent Labs     07/31/22  0450   *      Coagulation Recent Labs     07/31/22  0450   PTP 13.0   INR 1.0   APTT 25.8       Lipid Panel No results found for: CHOL, CHOLPOCT, CHOLX, CHLST, CHOLV, 789451, HDL, HDLP, LDL, LDLC, DLDLP, 398239, VLDLC, VLDL, TGLX, TRIGL, TRIGP, TGLPOCT, CHHD, CHHDX   BNP No results for input(s): BNPP in the last 72 hours. Liver Enzymes Recent Labs     07/31/22  0450   TP 7.2   ALB 3.8   AP 90      Thyroid Studies No results found for: T4, T3U, TSH, TSHEXT         Significant Diagnostic Studies: MRI BRAIN WO CONT    Result Date: 7/31/2022  Brain MR without contrast: Indication: Speech difficulty. Slurred speech. Left facial weakness. Left-sided numbness. TIA versus infarction. Procedure: Sagittal spin echo T1, axial FSE FLAIR and T2 and axial diffusion weighted scanning was performed. An axial T2* scan optimized to detect hemosiderin and calcium was performed. Coronal spin echo T1and FSE T2 scans were also performed. No contrast was administered. Comparison exam: Head CT 7/31/2022. Findings: Diffusion: There are no areas of restricted diffusion, no acute infarction. The T2* scan shows no acute or chronic hemorrhage or abnormal calcifications. Brain parenchyma: Axial FLAIR limited by motion but diagnostic. No focal brain lesion, mass or mass effect. Ventricles and sulci: Normal, no significant brain atrophy. Extra axial: No extra axial fluid collection or mass. Brain vasculature: Normal, no arterial vascular abnormality is noted. Craniocervical Junction: Normal. Extracranial and skull base:  Promise of nasopharyngeal adenoidal tissue is noted, reactive, within normal limits for age with small retention cysts. Reactive enlarged retropharyngeal lymph nodes adjacent to this with some symmetric prominence of palatine tonsils, all likely reactive and in part incompletely evaluated. Exam is somewhat limited by patient motion overall relatively diagnostic. No acute hemorrhage or acute infarction. No significant intracranial abnormality is appreciated. CT HEAD WO CONT    Result Date: 7/31/2022  EXAM: CT head INDICATION: Headache. COMPARISON: None.  TECHNIQUE: Axial CT imaging of the head was performed without intravenous contrast. Dose reduction techniques used: automated exposure control, adjustment of the mAs and/or kVp according to patient size, and iterative reconstruction techniques. _______________ FINDINGS: BRAIN AND POSTERIOR FOSSA: The sulci, folia, ventricles and basal cisterns are within normal limits for the patient's age. There is no intracranial hemorrhage, mass effect, or midline shift. There are no areas of abnormal parenchymal attenuation. EXTRA-AXIAL SPACES AND MENINGES: There are no abnormal extra-axial fluid collections. CALVARIUM: Intact. SINUSES: Clear. OTHER: None. _______________     No acute intracranial abnormalities. Findings were discussed with patient's care provider shortly after study performed. CTA HEAD NECK W WO CONT    Result Date: 7/31/2022  Brain CT angiogram and Neck CT angiogram: Indication: Evaluate for stenosis. Possible infarction. Evaluate for source of embolus. Sided weakness and numbness. Apparently, brain and neck vascular imaging was not performed at the time of the brain MRI that was performed 2 hours earlier. Procedure: Neck CT angiogram: Contrast was administered with a power injector. Axial thin section volume acquisition  scans were obtained timed for peak arterial enhancement. An automated triggering system was used. Axial images were generated. Angiographic coronal and sagittal reformations were also generated. Extensive 3-D separate workstation processing was performed by [facility] [employer]. Brain CT angiogram: Dedicated slab MIP overlapping angiographic reconstructions in the sagittal, axial and coronal plane of the brain component of the axial evaluation were also performed. Extensive 3-D separate workstation processing was performed by [facility] [employer]. One or more dose reduction techniques were used on this CT: automated exposure control, adjustment of the mAs and/or kVp according to patient size, and iterative reconstruction techniques.   The specific techniques used on this CT exam have been documented in the patient's electronic medical record. Digital Imaging and Communications in Medicine (DICOM) format image data are available to nonaffiliated external healthcare facilities or entities on a secure, media free, reciprocally searchable basis with patient authorization for at least a 12-month period after this study. Comparison exam: None. Findings: Neck CTA: Any internal carotid stenosis described below uses NASCET criteria, minimal residual lumen diameter versus the non-tapering cervical internal carotid artery. Aortic Arch: Normal branching pattern. No proximal great vessel stenosis. Left carotid: No stenosis or other vascular abnormality. Right carotid:  No stenosis or other vascular abnormality. The vertebral arteries are codominant. Right vertebral artery:  No stenosis or other vascular abnormality. Left vertebral artery:  No stenosis or other vascular abnormality. Lung apices:  No mass. Neck soft tissues: Some prominence of palatine tonsils and nasopharyngeal adenoidal tissue with retropharyngeal prominent lymph nodes as seen on previous brain MRI, all likely reactive. Brain CTA: Carotid siphon and supraclinoid internal carotid artery: No significant stenosis. Minimal infundibular origin right ophthalmic artery, no significance likely. M1 segment and proximal M2 segment MCA:  No significant stenosis or aneurysm. A1 segment, anterior communicating artery and proximal A2 segments:  Dominant right A1 segment without associated aneurysm. Vertebrobasilar system:  No significant stenosis or aneurysm. Distal anterior cerebral artery:  No significant stenosis or aneurysm. Distal MCA M2/M3 segment:  No significant stenosis or aneurysm. No other significant vascular abnormality. Brain parenchyma on source data:  No significant parenchymal brain abnormality. 1. No hemodynamically significant cervical vascular stenosis. 2. Unremarkable brain CTA. No results found for this or any previous visit.         CC: Monserrat Mccray MD

## 2022-07-31 NOTE — ED NOTES
This RN woke the patient up, The patients mother asked her if she wanted to \"go upstairs and sleep until she's ready to go home? \" the patient replied, \"yes! I don't care, just let me sleep. \" This RN will update the ED charge RN and MD on the patients wishes. This RN will arrange transport at this time.

## 2022-07-31 NOTE — ED NOTES
This RN spoke to the patients family and the patients mother atated the patient needed to be discharged home where the mother would \"monitor and take care of her\". The patient was unable to wake up from her sleep when this RN attempted to ask what the patient wanted to do with her care. This RN updated the ED MD and ED charge RN. This RN paged the Hospitalist at this time as well. The hospitalist called back within two minutes and this RN updated Dr. Jose M Jamil, who stated she was not comfortable discharging  The patient at this time as it was not in the patients best interest considering her symptoms and current lethargy. This RN will continue to monitor the patient and I will update the patient and her family on the MD's advice.

## 2022-07-31 NOTE — PROGRESS NOTES
AVS reviewed with pt and family and both verbalized understanding. PIV removed, 2x2 applied to site, site wnl.

## 2022-07-31 NOTE — ED PROVIDER NOTES
EMERGENCY DEPARTMENT HISTORY AND PHYSICAL EXAM      Date: 7/31/2022  Patient Name: Festus Alejandro    History of Presenting Illness     Chief Complaint   Patient presents with    Numbness       History Provided By: Patient and boyfriend    HPI: Festus Alejandro, 27 y.o. female with  no significant past medical history  presents to the ED, brought in by boyfriend, with c/c left-sided weakness and numbness. Boyfriend states they went to bed at approximately 2 AM with patient at baseline. Boyfriend states woke up approximately 20 minutes prior to ED arrival and noticed patient with slurred speech, left-sided facial droop, weakness and numbness to left side. Patient somewhat anxious at arrival, has obvious tremors generalized, his obvious left facial droop and is very slow in low speech difficult to understand. She admits to a mild headache and numbness of left side. She denies chest or abdomen pain. She does admit to alcohol last night but no drugs. There are no other complaints, changes, or physical findings at this time. PCP: Gerardo Ulloa MD    No current facility-administered medications on file prior to encounter. Current Outpatient Medications on File Prior to Encounter   Medication Sig Dispense Refill    acetaminophen (TYLENOL) 500 mg tablet Take 2 Tabs by mouth every six (6) hours as needed. 60 Tab 0    guaiFENesin ER (MUCINEX) 600 mg ER tablet Take 1 Tab by mouth two (2) times a day. 30 Tab 0       Past History     Past Medical History:  No past medical history on file. Past Surgical History:  No past surgical history on file. Family History:  No family history on file. Social History:  Social History     Tobacco Use    Smoking status: Never    Smokeless tobacco: Never   Substance Use Topics    Alcohol use: No       Allergies:   Allergies   Allergen Reactions    Pcn [Penicillins] Rash             Review of Systems   Review of Systems   Unable to perform ROS: Acuity of condition Physical Exam   Physical Exam  Vitals and nursing note reviewed. Constitutional:       General: She is not in acute distress. Appearance: She is well-developed. She is not diaphoretic. Comments: Anxious female in no acute distress. HENT:      Head: Normocephalic and atraumatic. Jaw: No trismus. Right Ear: External ear normal. No swelling or tenderness. Tympanic membrane is not perforated, erythematous or bulging. Left Ear: External ear normal. No swelling or tenderness. Tympanic membrane is not perforated, erythematous or bulging. Nose: Nose normal. No mucosal edema or rhinorrhea. Right Sinus: No maxillary sinus tenderness or frontal sinus tenderness. Left Sinus: No maxillary sinus tenderness or frontal sinus tenderness. Mouth/Throat:      Mouth: No oral lesions. Dentition: No dental abscesses. Pharynx: Uvula midline. No oropharyngeal exudate, posterior oropharyngeal erythema or uvula swelling. Tonsils: No tonsillar abscesses. Eyes:      General: No scleral icterus. Right eye: No discharge. Left eye: No discharge. Conjunctiva/sclera: Conjunctivae normal.   Cardiovascular:      Rate and Rhythm: Normal rate and regular rhythm. Heart sounds: Normal heart sounds. No murmur heard. No friction rub. No gallop. Pulmonary:      Effort: Pulmonary effort is normal. No tachypnea, accessory muscle usage or respiratory distress. Breath sounds: Normal breath sounds. No decreased breath sounds, wheezing, rhonchi or rales. Abdominal:      Palpations: Abdomen is soft. Musculoskeletal:         General: No tenderness. Normal range of motion. Cervical back: Normal range of motion and neck supple. Lymphadenopathy:      Cervical: No cervical adenopathy. Skin:     General: Skin is warm and dry. Findings: No erythema or rash. Neurological:      Mental Status: She is alert and oriented to person, place, and time. Comments: Anxious female, is mild left facial droop, she is left arm drift, also his left leg weakness. Speech is somewhat slurred, difficult to understand. There is numbness left side. Psychiatric:         Judgment: Judgment normal.       Lab and Diagnostic Study Results   Labs -     Recent Results (from the past 12 hour(s))   GLUCOSE, POC    Collection Time: 07/31/22  4:45 AM   Result Value Ref Range    Glucose (POC) 95 70 - 110 mg/dL   CBC WITH AUTOMATED DIFF    Collection Time: 07/31/22  4:50 AM   Result Value Ref Range    WBC 9.0 4.6 - 13.2 K/uL    RBC 4.85 4.20 - 5.30 M/uL    HGB 14.0 12.0 - 16.0 g/dL    HCT 41.7 35.0 - 45.0 %    MCV 86.0 78.0 - 100.0 FL    MCH 28.9 24.0 - 34.0 PG    MCHC 33.6 31.0 - 37.0 g/dL    RDW 13.5 11.6 - 14.5 %    PLATELET 724 (H) 547 - 420 K/uL    MPV 8.9 (L) 9.2 - 11.8 FL    NRBC 0.0 0  WBC    ABSOLUTE NRBC 0.00 0.00 - 0.01 K/uL    NEUTROPHILS 49 40 - 73 %    LYMPHOCYTES 40 21 - 52 %    MONOCYTES 9 3 - 10 %    EOSINOPHILS 2 0 - 5 %    BASOPHILS 0 0 - 2 %    IMMATURE GRANULOCYTES 0 0.0 - 0.5 %    ABS. NEUTROPHILS 4.4 1.8 - 8.0 K/UL    ABS. LYMPHOCYTES 3.6 0.9 - 3.6 K/UL    ABS. MONOCYTES 0.8 0.05 - 1.2 K/UL    ABS. EOSINOPHILS 0.1 0.0 - 0.4 K/UL    ABS. BASOPHILS 0.0 0.0 - 0.1 K/UL    ABS. IMM.  GRANS. 0.0 0.00 - 0.04 K/UL    DF AUTOMATED     PROTHROMBIN TIME + INR    Collection Time: 07/31/22  4:50 AM   Result Value Ref Range    Prothrombin time 13.0 11.5 - 15.2 sec    INR 1.0 0.8 - 1.2     METABOLIC PANEL, COMPREHENSIVE    Collection Time: 07/31/22  4:50 AM   Result Value Ref Range    Sodium 143 136 - 145 mmol/L    Potassium 3.9 3.5 - 5.5 mmol/L    Chloride 110 100 - 111 mmol/L    CO2 23 21 - 32 mmol/L    Anion gap 10 3.0 - 18 mmol/L    Glucose 100 (H) 74 - 99 mg/dL    BUN 6 (L) 7.0 - 18 MG/DL    Creatinine 0.82 0.6 - 1.3 MG/DL    BUN/Creatinine ratio 7 (L) 12 - 20      GFR est AA >60 >60 ml/min/1.73m2    GFR est non-AA >60 >60 ml/min/1.73m2    Calcium 9.1 8.5 - 10.1 MG/DL Bilirubin, total 0.3 0.2 - 1.0 MG/DL    ALT (SGPT) 26 13 - 56 U/L    AST (SGOT) 23 10 - 38 U/L    Alk. phosphatase 90 45 - 117 U/L    Protein, total 7.2 6.4 - 8.2 g/dL    Albumin 3.8 3.4 - 5.0 g/dL    Globulin 3.4 2.0 - 4.0 g/dL    A-G Ratio 1.1 0.8 - 1.7     PTT    Collection Time: 07/31/22  4:50 AM   Result Value Ref Range    aPTT 25.8 23.0 - 36.4 SEC   ETHYL ALCOHOL    Collection Time: 07/31/22  4:50 AM   Result Value Ref Range    ALCOHOL(ETHYL),SERUM 60 (H) 0 - 3 MG/DL   BETA HCG, QT    Collection Time: 07/31/22  4:50 AM   Result Value Ref Range    Beta HCG, QT <1 0 - 10 MIU/ML   TROPONIN-HIGH SENSITIVITY    Collection Time: 07/31/22  4:50 AM   Result Value Ref Range    Troponin-High Sensitivity <3 0 - 54 ng/L   MAGNESIUM    Collection Time: 07/31/22  4:50 AM   Result Value Ref Range    Magnesium 2.2 1.6 - 2.6 mg/dL   CK    Collection Time: 07/31/22  4:50 AM   Result Value Ref Range     (H) 26 - 192 U/L   URINALYSIS W/ RFLX MICROSCOPIC    Collection Time: 07/31/22  7:40 AM   Result Value Ref Range    Color YELLOW      Appearance CLEAR      Specific gravity 1.015 1.005 - 1.030      pH (UA) 7.0 5.0 - 8.0      Protein Negative NEG mg/dL    Glucose Negative NEG mg/dL    Ketone Negative NEG mg/dL    Bilirubin Negative NEG      Blood Negative NEG      Urobilinogen 0.2 0.2 - 1.0 EU/dL    Nitrites Negative NEG      Leukocyte Esterase SMALL (A) NEG     DRUG SCREEN, URINE    Collection Time: 07/31/22  7:40 AM   Result Value Ref Range    BENZODIAZEPINES Negative NEG      BARBITURATES Negative NEG      THC (TH-CANNABINOL) Positive (A) NEG      OPIATES Negative NEG      PCP(PHENCYCLIDINE) Negative NEG      COCAINE Negative NEG      AMPHETAMINES Negative NEG      METHADONE Negative NEG      HDSCOM (NOTE)        Radiologic Studies -   @[email or web address]@  CT Results  (Last 48 hours)                 07/31/22 0508  CT HEAD WO CONT Final result    Impression:      No acute intracranial abnormalities.        Findings were discussed with patient's care provider shortly after study   performed. Narrative:  EXAM: CT head       INDICATION: Headache. COMPARISON: None. TECHNIQUE: Axial CT imaging of the head was performed without intravenous   contrast. Dose reduction techniques used: automated exposure control, adjustment   of the mAs and/or kVp according to patient size, and iterative reconstruction   techniques. _______________       FINDINGS:       BRAIN AND POSTERIOR FOSSA: The sulci, folia, ventricles and basal cisterns are   within normal limits for the patient's age. There is no intracranial hemorrhage,   mass effect, or midline shift. There are no areas of abnormal parenchymal   attenuation. EXTRA-AXIAL SPACES AND MENINGES: There are no abnormal extra-axial fluid   collections. CALVARIUM: Intact. SINUSES: Clear. OTHER: None.       _______________                 CXR Results  (Last 48 hours)      None            Medical Decision Making and ED Course   - I am the first provider for this patient. I reviewed the vital signs, available nursing notes, past medical history, past surgical history, family history and social history. - Initial assessment performed. The patients presenting problems have been discussed, and they are in agreement with the care plan formulated and outlined with them. I have encouraged them to ask questions as they arise throughout their visit. Vital Signs-Reviewed the patient's vital signs. Patient Vitals for the past 12 hrs:   Temp Pulse Resp BP SpO2   07/31/22 0716 -- 65 16 127/83 98 %   07/31/22 0452 -- -- -- -- 98 %   07/31/22 0437 97.3 °F (36.3 °C) 73 20 124/79 96 %       Differential Diagnosis & Medical Decision Making Provider Note:   CVA, TIA, complex migraine, demyelinating disease, psych, platelet abnormalities, drug abuse,       ED Course: Eliseo Reilly female with strokelike symptoms. Stroke alert was called.   Patient seen by Dr. Cande belle who advised admission for further evaluation. Repeat exam with slightly improved symptoms, patient more alert, facial droop almost resolved, still has left hand drift. CT scan is negative. Consulted with Dr. Brittny Rasheed who advised MRI and will follow consult on hospitalist service. Discussed the patient with hospitalist,  Han Enriquez who will admit. Procedures   Teul  Disposition   Disposition: Admitted to telemetry the case was discussed with the admitting physician Kaleb Mejía        Diagnosis:   1. Left-sided weakness                    Diagnosis/Clinical Impression     Clinical Impression:   1. Left-sided weakness        Attestations: Catalina ARRIAGA MD, am the primary clinician of record. Please note that this dictation was completed with [employer], the computer voice recognition software. Quite often unanticipated grammatical, syntax, homophones, and other interpretive errors are inadvertently transcribed by the computer software. Please disregard these errors. Please excuse any errors that have escaped final proofreading. Thank you.

## 2022-07-31 NOTE — PROGRESS NOTES
D/c plan: Home. Family to transport. CM spoke w/ pt. Confirmed information on pt's face sheet. The pt states she doesn't have insurance at this time. Advised pt that CM will refer to [employer] to screen pt for Medicaid. Pt states Dr. Giselle Smith is no longer her PCP. Advised pt that CM will establish her w/ the [facility] clinic and someone from the [facility] team will call her with appt information. Also, advised her that the [facility] clinic will refer to neurology. She verbalized an understanding. Pt is independent at baseline and will d/c back home w/ her family. Reason for Admission:   Left-sided weakness [R53.1]    PCP: Georgette Chery MD    There are currently no Active Isolations  No active infections                RUR Score:     Observation              Resources/supports,as identified by patient/family:   Spouse and boyfriend. Barriers to discharge: self pay             Plan for utilizing home health:    no                          Likelihood of readmission:            Transition of Care Plan:                    Initial assessment completed with patient. Cognitive status of patient: oriented to time, place, person and situation. Face sheet information confirmed:  yes. Pt doesn't have insurance. This patient lives in a single family home with mother. Patient is able to navigate steps as needed. Prior to hospitalization, patient was considered to be independent with ADLs/IADLS : yes . The mother and boyfriend will be available to transport patient home upon discharge. The patient already has none reported,edical equipment available in the home. Patient is not currently active with home health. Patient has not stayed in a skilled nursing facility or rehab. This patient is on dialysis/days :no    Currently, the discharge plan is Home. The patient states that she can obtain her medications from the pharmacy, and take her medications as directed.     Patient's current insurance is Payor: [facility] / Plan: [facility] PPO / Product Type: PPO /        Care Management Interventions  PCP Verified by CM:  (Pt doesn't have a PCP. CMS to call the pt 08/01/22 w/ appt information for [facility]. )  Mode of Transport at Discharge: Other (see comment) (family to transport. )  Transition of Care Consult (CM Consult):  Other (home w/ family assistance.)  Discharge Durable Medical Equipment: No  Physical Therapy Consult: No  Occupational Therapy Consult: No  Speech Therapy Consult: No  Support Systems: Spouse/Significant Other, Parent(s)  Confirm Follow Up Transport: Family  The Plan for Transition of Care is Related to the Following Treatment Goals : home  The Patient and/or Patient Representative was Provided with a Choice of Provider and Agrees with the Discharge Plan?: Yes (The pt is alert and oriented.)  Freedom of Choice List was Provided with Basic Dialogue that Supports the Patient's Individualized Plan of Care/Goals, Treatment Preferences and Shares the Quality Data Associated with the Providers?:  (n/a)  Discharge Location  Patient Expects to be Discharged to[de-identified] Home

## 2022-07-31 NOTE — CONSULTS
NEUROLOGY CONSULTATION NOTE    Patient: Doreen Beyer MRN: [id number]  CSN: [id number]    YOB: 1995  Age: 27 y.o. Sex: female    DOA: 7/31/2022 LOS:  LOS: 0 days        Requesting Physician: Dr. Buddy Malin  Reason for Consultation: Left sided numbness               HISTORY OF PRESENT ILLNESS:   Doreen Beyer is a 27 y.o. right-handed female with no remarkable past medical history, who has been having intermittent numbness/tingling sensation on the left side of her body, arms/legs, intermittently over the last 3 months. Tonight after midnight, she started to have the symptoms again, this time with pain radiating from left shoulder down to her head and numbness sensation involving the left arm and left leg. She denies any facial symptoms. She was seen to have facial droop, therefore, she was brought to ER by her boyfriend. Her blood alcohol level was elevated. She says she drinks only on the weekends. She uses marijuana only occasionally. She does not use any other drugs. She also started to have headaches recently. Headaches are usually located on the right side, behind the eyes, throbbing in quality, accompanied by nausea, photophobia and phonophobia. She also has episodes of dizziness. She passed out once. The history was also obtained from her brother and her mother. Neuro Work-up:  Brain MRI: Exam is somewhat limited by patient motion overall relatively diagnostic. No acute hemorrhage or acute infarction. No significant intracranial abnormality is appreciated. CT Head: Unremarkable. CTA of head and neck: Report pending  Echocardiogram:   Lipid panel: No results found for: CHOL, CHOLPOCT, CHOLX, CHLST, CHOLV, 630108, HDL, HDLP, LDL, LDLC, DLDLP, 172969, VLDLC, VLDL, TGLX, TRIGL, TRIGP, TGLPOCT, CHHD, CHHDX  HbA1c:   No results found for: HBA1C, YCN3BCKV, NSS2QCMX     PAST MEDICAL HISTORY:  No past medical history on file.   PAST SURGICAL HISTORY:  No past surgical history on file. FAMILY HISTORY:  No family history on file. SOCIAL HISTORY:  Social History     Socioeconomic History    Marital status: SINGLE   Tobacco Use    Smoking status: Never    Smokeless tobacco: Never   Substance and Sexual Activity    Alcohol use: No     MEDICATIONS:  Current Facility-Administered Medications   Medication Dose Route Frequency    [START ON 8/1/2022] aspirin chewable tablet 81 mg  81 mg Oral DAILY    0.9% sodium chloride infusion  100 mL/hr IntraVENous CONTINUOUS    acetaminophen (TYLENOL) tablet 650 mg  650 mg Oral Q6H PRN    acetaminophen (TYLENOL) suppository 650 mg  650 mg Rectal Q4H PRN    ondansetron (ZOFRAN) injection 4 mg  4 mg IntraVENous Q6H PRN    gadobenate dimeglumine (MULTIHANCE) 529 mg/mL (0.1mmol/0.2mL) contrast injection 15 mL  15 mL IntraVENous RAD ONCE     Current Outpatient Medications   Medication Sig    acetaminophen (TYLENOL) 500 mg tablet Take 2 Tabs by mouth every six (6) hours as needed. guaiFENesin ER (MUCINEX) 600 mg ER tablet Take 1 Tab by mouth two (2) times a day. Prior to Admission medications    Medication Sig Start Date End Date Taking? Authorizing Provider   acetaminophen (TYLENOL) 500 mg tablet Take 2 Tabs by mouth every six (6) hours as needed. 12/9/18   Nba Michele MD   guaiFENesin ER (MUCINEX) 600 mg ER tablet Take 1 Tab by mouth two (2) times a day. 12/9/18   Nba Michele MD       ALLERGIES:  Allergies   Allergen Reactions    Pcn [Penicillins] Rash             Review of Systems  GENERAL: No fevers or chills. HEENT: No change in vision, earache, tinnitus, sore throat or sinus congestion. NECK: No pain or stiffness. CARDIOVASCULAR: No chest pain or pressure. No palpitations. PULMONARY: No shortness of breath, cough or wheeze. GASTROINTESTINAL: No abdominal pain, nausea, vomiting or diarrhea. GENITOURINARY: No urinary frequency or dysuria. MUSCULOSKELETAL: No joint or muscle pain, no back pain, no recent trauma. DERMATOLOGIC: No rash, no itching, no lesions. ENDOCRINE: No heat or cold intolerance. HEMATOLOGICAL: No anemia or easy bruising or bleeding. NEUROLOGIC: See HPI. The patient denies any weakness but she does have generalized weakness. PHYSICAL EXAMINATION:   Visit Vitals  /81   Pulse 66   Temp 97.3 °F (36.3 °C)   Resp 20   Wt 78 kg (172 lb)   SpO2 100%   BMI 30.47 kg/m²      O2 Device: None (Room air)  GENERAL: Pleasant, in no apparent distress. HEENT: Moist mucous membranes, sclerae anicteric, scalp is atraumatic. CVS: Regular rate and rhythm, no murmurs or gallops. No carotid bruits. PULMONARY: Clear to auscultation bilaterally. No rales or rhonchi. No wheezing. EXTREMITIES: Normal range of motion at all sites. No deformities. ABDOMEN: Soft, nontender. SKIN: No rashes or ecchymoses. Warm and dry. NEUROLOGIC: Alert and oriented x3. Speech is fluent without any aphasia or dysarthria. Cranial nerves: Face is symmetric with symmetric smile. Facial sensation is intact. Extraocular movements are intact with no nystagmus. Visual fields are full to confrontation. PERRL. Tongue is midline. Palate elevates symmetrically. Hearing intact to speech. Sternocleidomastoid and trapezius strengths are full bilaterally. Motor: Normal tone and normal bulk on all four extremities. Strength is full on all four segmentally. There is no pronator drift or orbiting. Sensory: Intact to pinprick and touch on all four. Normal vibratory sensation on toes bilaterally. Coordination: Intact coordination with finger-nose-finger bilaterally. Normal fine movements. No bradykinesia detected. Deep tendon reflexes: 2+ at biceps, brachioradialis, patella and ankles bilaterally. Toes are down-going bilaterally. Gait assessment: Deferred.     Labs: Results:       Chemistry Recent Labs     07/31/22  0450   *      K 3.9      CO2 23   BUN 6*   CREA 0.82   CA 9.1   AGAP 10   BUCR 7*   AP 90   TP 7.2   ALB 3. 8   GLOB 3.4   AGRAT 1.1      CBC w/Diff Recent Labs     07/31/22 0450   WBC 9.0   RBC 4.85   HGB 14.0   HCT 41.7   *   GRANS 49   LYMPH 40   EOS 2      Cardiac Enzymes Recent Labs     07/31/22 0450   *      Coagulation Recent Labs     07/31/22 0450   PTP 13.0   INR 1.0   APTT 25.8       Lipid Panel No results found for: CHOL, CHOLPOCT, CHOLX, CHLST, CHOLV, 026130, HDL, HDLP, LDL, LDLC, DLDLP, 027874, VLDLC, VLDL, TGLX, TRIGL, TRIGP, TGLPOCT, CHHD, CHHDX   BNP No results for input(s): BNPP in the last 72 hours. Liver Enzymes Recent Labs     07/31/22 0450   TP 7.2   ALB 3.8   AP 90      Thyroid Studies No results found for: T4, T3U, TSH, TSHEXT       Radiology:  MRI BRAIN WO CONT    Result Date: 7/31/2022  Brain MR without contrast: Indication: Speech difficulty. Slurred speech. Left facial weakness. Left-sided numbness. TIA versus infarction. Procedure: Sagittal spin echo T1, axial FSE FLAIR and T2 and axial diffusion weighted scanning was performed. An axial T2* scan optimized to detect hemosiderin and calcium was performed. Coronal spin echo T1and FSE T2 scans were also performed. No contrast was administered. Comparison exam: Head CT 7/31/2022. Findings: Diffusion: There are no areas of restricted diffusion, no acute infarction. The T2* scan shows no acute or chronic hemorrhage or abnormal calcifications. Brain parenchyma: Axial FLAIR limited by motion but diagnostic. No focal brain lesion, mass or mass effect. Ventricles and sulci: Normal, no significant brain atrophy. Extra axial: No extra axial fluid collection or mass. Brain vasculature: Normal, no arterial vascular abnormality is noted. Craniocervical Junction: Normal. Extracranial and skull base:  Promise of nasopharyngeal adenoidal tissue is noted, reactive, within normal limits for age with small retention cysts.  Reactive enlarged retropharyngeal lymph nodes adjacent to this with some symmetric prominence of palatine tonsils, all likely reactive and in part incompletely evaluated. Exam is somewhat limited by patient motion overall relatively diagnostic. No acute hemorrhage or acute infarction. No significant intracranial abnormality is appreciated. CT HEAD WO CONT    Result Date: 7/31/2022  EXAM: CT head INDICATION: Headache. COMPARISON: None. TECHNIQUE: Axial CT imaging of the head was performed without intravenous contrast. Dose reduction techniques used: automated exposure control, adjustment of the mAs and/or kVp according to patient size, and iterative reconstruction techniques. _______________ FINDINGS: BRAIN AND POSTERIOR FOSSA: The sulci, folia, ventricles and basal cisterns are within normal limits for the patient's age. There is no intracranial hemorrhage, mass effect, or midline shift. There are no areas of abnormal parenchymal attenuation. EXTRA-AXIAL SPACES AND MENINGES: There are no abnormal extra-axial fluid collections. CALVARIUM: Intact. SINUSES: Clear. OTHER: None. _______________     No acute intracranial abnormalities. Findings were discussed with patient's care provider shortly after study performed. ASSESSMENT/IMPRESSION:  The clinical presentation is most suggestive of atypical complex migraines. The patient is currently asymptomatic. Intermittent nature of the symptoms is more suggestive of migraines other than TIA or a stroke. Another possibility would be somatization disorder due to underlying stressors or anxiety. Brain MRI was negative. It would be reasonable to observe the patient overnight for 1 day. RECOMMENDATIONS:  CTA head and neck results pending  Echocardiogram can be done as outpatient. I do not think we need to do echocardiogram.  IV fluids or oral liquids for hydration. No need for stroke pathway. I do not think the patient has a stroke. I would like to check serum B12 and thiamine levels    The patient can be discharged home tomorrow. I will follow the patient as outpatient. Please do not hesitate to return with any questions.    ------------------------------------  Jihan Lala MD  7/31/2022  12:10 PM

## 2022-08-05 LAB — VIT B1 BLD-SCNC: 161 NMOL/L (ref 66.5–200)

## 2023-02-06 ENCOUNTER — TRANSCRIBE ORDER (OUTPATIENT)
Dept: SCHEDULING | Age: 28
End: 2023-02-06